# Patient Record
Sex: FEMALE | Race: WHITE | URBAN - METROPOLITAN AREA
[De-identification: names, ages, dates, MRNs, and addresses within clinical notes are randomized per-mention and may not be internally consistent; named-entity substitution may affect disease eponyms.]

---

## 2022-06-06 ENCOUNTER — MED REC SCAN ONLY (OUTPATIENT)
Dept: INTERNAL MEDICINE CLINIC | Facility: CLINIC | Age: 47
End: 2022-06-06

## 2022-06-06 ENCOUNTER — OFFICE VISIT (OUTPATIENT)
Dept: INTERNAL MEDICINE CLINIC | Facility: CLINIC | Age: 47
End: 2022-06-06
Payer: COMMERCIAL

## 2022-06-06 ENCOUNTER — LAB ENCOUNTER (OUTPATIENT)
Dept: LAB | Age: 47
End: 2022-06-06
Attending: INTERNAL MEDICINE

## 2022-06-06 VITALS
TEMPERATURE: 98 F | SYSTOLIC BLOOD PRESSURE: 108 MMHG | HEIGHT: 60 IN | WEIGHT: 147.19 LBS | HEART RATE: 64 BPM | OXYGEN SATURATION: 98 % | DIASTOLIC BLOOD PRESSURE: 68 MMHG | BODY MASS INDEX: 28.9 KG/M2

## 2022-06-06 DIAGNOSIS — Z00.00 PHYSICAL EXAM: Primary | ICD-10-CM

## 2022-06-06 DIAGNOSIS — J30.2 SEASONAL ALLERGIES: ICD-10-CM

## 2022-06-06 DIAGNOSIS — Z00.00 PHYSICAL EXAM: ICD-10-CM

## 2022-06-06 DIAGNOSIS — N60.19 FIBROCYSTIC BREAST DISEASE (FCBD), UNSPECIFIED LATERALITY: ICD-10-CM

## 2022-06-06 LAB
ALBUMIN SERPL-MCNC: 3.9 G/DL (ref 3.4–5)
ALBUMIN/GLOB SERPL: 1 {RATIO} (ref 1–2)
ALP LIVER SERPL-CCNC: 55 U/L
ALT SERPL-CCNC: 20 U/L
ANION GAP SERPL CALC-SCNC: 5 MMOL/L (ref 0–18)
AST SERPL-CCNC: 14 U/L (ref 15–37)
BASOPHILS # BLD AUTO: 0.03 X10(3) UL (ref 0–0.2)
BASOPHILS NFR BLD AUTO: 0.4 %
BILIRUB SERPL-MCNC: 0.2 MG/DL (ref 0.1–2)
BUN BLD-MCNC: 26 MG/DL (ref 7–18)
BUN/CREAT SERPL: 34.7 (ref 10–20)
CALCIUM BLD-MCNC: 9.6 MG/DL (ref 8.5–10.1)
CHLORIDE SERPL-SCNC: 107 MMOL/L (ref 98–112)
CHOLEST SERPL-MCNC: 175 MG/DL (ref ?–200)
CO2 SERPL-SCNC: 27 MMOL/L (ref 21–32)
CREAT BLD-MCNC: 0.75 MG/DL
DEPRECATED RDW RBC AUTO: 41.1 FL (ref 35.1–46.3)
EOSINOPHIL # BLD AUTO: 0.11 X10(3) UL (ref 0–0.7)
EOSINOPHIL NFR BLD AUTO: 1.4 %
ERYTHROCYTE [DISTWIDTH] IN BLOOD BY AUTOMATED COUNT: 12.7 % (ref 11–15)
EST. AVERAGE GLUCOSE BLD GHB EST-MCNC: 111 MG/DL (ref 68–126)
FASTING PATIENT LIPID ANSWER: NO
FASTING STATUS PATIENT QL REPORTED: NO
GLOBULIN PLAS-MCNC: 3.8 G/DL (ref 2.8–4.4)
GLUCOSE BLD-MCNC: 87 MG/DL (ref 70–99)
HBA1C MFR BLD: 5.5 % (ref ?–5.7)
HCT VFR BLD AUTO: 37 %
HDLC SERPL-MCNC: 62 MG/DL (ref 40–59)
HGB BLD-MCNC: 11.9 G/DL
IMM GRANULOCYTES # BLD AUTO: 0.02 X10(3) UL (ref 0–1)
IMM GRANULOCYTES NFR BLD: 0.3 %
LDLC SERPL CALC-MCNC: 97 MG/DL (ref ?–100)
LYMPHOCYTES # BLD AUTO: 2.84 X10(3) UL (ref 1–4)
LYMPHOCYTES NFR BLD AUTO: 37.4 %
MCH RBC QN AUTO: 28.4 PG (ref 26–34)
MCHC RBC AUTO-ENTMCNC: 32.2 G/DL (ref 31–37)
MCV RBC AUTO: 88.3 FL
MONOCYTES # BLD AUTO: 0.49 X10(3) UL (ref 0.1–1)
MONOCYTES NFR BLD AUTO: 6.5 %
NEUTROPHILS # BLD AUTO: 4.1 X10 (3) UL (ref 1.5–7.7)
NEUTROPHILS # BLD AUTO: 4.1 X10(3) UL (ref 1.5–7.7)
NEUTROPHILS NFR BLD AUTO: 54 %
NONHDLC SERPL-MCNC: 113 MG/DL (ref ?–130)
OSMOLALITY SERPL CALC.SUM OF ELEC: 292 MOSM/KG (ref 275–295)
PLATELET # BLD AUTO: 333 10(3)UL (ref 150–450)
POTASSIUM SERPL-SCNC: 4.7 MMOL/L (ref 3.5–5.1)
PROT SERPL-MCNC: 7.7 G/DL (ref 6.4–8.2)
RBC # BLD AUTO: 4.19 X10(6)UL
SODIUM SERPL-SCNC: 139 MMOL/L (ref 136–145)
T4 FREE SERPL-MCNC: 1 NG/DL (ref 0.8–1.7)
TRIGL SERPL-MCNC: 90 MG/DL (ref 30–149)
TSI SER-ACNC: 4.61 MIU/ML (ref 0.36–3.74)
VIT D+METAB SERPL-MCNC: 40.8 NG/ML (ref 30–100)
VLDLC SERPL CALC-MCNC: 15 MG/DL (ref 0–30)
WBC # BLD AUTO: 7.6 X10(3) UL (ref 4–11)

## 2022-06-06 PROCEDURE — 3008F BODY MASS INDEX DOCD: CPT | Performed by: INTERNAL MEDICINE

## 2022-06-06 PROCEDURE — 3078F DIAST BP <80 MM HG: CPT | Performed by: INTERNAL MEDICINE

## 2022-06-06 PROCEDURE — 99386 PREV VISIT NEW AGE 40-64: CPT | Performed by: INTERNAL MEDICINE

## 2022-06-06 PROCEDURE — 85025 COMPLETE CBC W/AUTO DIFF WBC: CPT

## 2022-06-06 PROCEDURE — 83036 HEMOGLOBIN GLYCOSYLATED A1C: CPT

## 2022-06-06 PROCEDURE — 36415 COLL VENOUS BLD VENIPUNCTURE: CPT

## 2022-06-06 PROCEDURE — 84443 ASSAY THYROID STIM HORMONE: CPT

## 2022-06-06 PROCEDURE — 80061 LIPID PANEL: CPT

## 2022-06-06 PROCEDURE — 3074F SYST BP LT 130 MM HG: CPT | Performed by: INTERNAL MEDICINE

## 2022-06-06 PROCEDURE — 82306 VITAMIN D 25 HYDROXY: CPT

## 2022-06-06 PROCEDURE — 80053 COMPREHEN METABOLIC PANEL: CPT

## 2022-06-06 PROCEDURE — 84439 ASSAY OF FREE THYROXINE: CPT

## 2022-06-06 RX ORDER — MULTIVIT-MIN/IRON/FOLIC ACID/K 18-600-40
1 CAPSULE ORAL
COMMUNITY

## 2022-06-06 RX ORDER — RUFINAMIDE 40 MG/ML
1 SUSPENSION ORAL DAILY
COMMUNITY

## 2022-06-08 DIAGNOSIS — D64.9 ANEMIA, UNSPECIFIED TYPE: ICD-10-CM

## 2022-06-08 DIAGNOSIS — R79.89 TSH ELEVATION: Primary | ICD-10-CM

## 2022-06-15 ENCOUNTER — OFFICE VISIT (OUTPATIENT)
Dept: OBGYN CLINIC | Facility: CLINIC | Age: 47
End: 2022-06-15
Payer: COMMERCIAL

## 2022-06-15 VITALS
BODY MASS INDEX: 29 KG/M2 | DIASTOLIC BLOOD PRESSURE: 70 MMHG | WEIGHT: 146.63 LBS | SYSTOLIC BLOOD PRESSURE: 105 MMHG | HEART RATE: 63 BPM

## 2022-06-15 DIAGNOSIS — Z01.419 ENCOUNTER FOR GYNECOLOGICAL EXAMINATION: Primary | ICD-10-CM

## 2022-06-15 DIAGNOSIS — Z12.4 SCREENING FOR MALIGNANT NEOPLASM OF CERVIX: ICD-10-CM

## 2022-06-15 DIAGNOSIS — Z12.31 ENCOUNTER FOR SCREENING MAMMOGRAM FOR BREAST CANCER: ICD-10-CM

## 2022-06-15 PROCEDURE — 99386 PREV VISIT NEW AGE 40-64: CPT | Performed by: OBSTETRICS & GYNECOLOGY

## 2022-06-15 PROCEDURE — 3074F SYST BP LT 130 MM HG: CPT | Performed by: OBSTETRICS & GYNECOLOGY

## 2022-06-15 PROCEDURE — 3078F DIAST BP <80 MM HG: CPT | Performed by: OBSTETRICS & GYNECOLOGY

## 2022-06-16 LAB — HPV I/H RISK 1 DNA SPEC QL NAA+PROBE: NEGATIVE

## 2022-06-24 ENCOUNTER — PATIENT MESSAGE (OUTPATIENT)
Dept: OBGYN CLINIC | Facility: CLINIC | Age: 47
End: 2022-06-24

## 2022-06-25 NOTE — TELEPHONE ENCOUNTER
From: Janell Jamil  To: Dom Rogers MD  Sent: 6/24/2022 7:04 PM CDT  Subject: Follow Up From Exam    Dr. Karl Guzman,  I wanted to follow-up on my Pap smear- I saw the negative HPV results come in but nothing in the paper.     Thank you,  Dajuan Riojas

## 2022-07-02 ENCOUNTER — HOSPITAL ENCOUNTER (OUTPATIENT)
Dept: MAMMOGRAPHY | Facility: HOSPITAL | Age: 47
Discharge: HOME | End: 2022-07-02
Attending: OBSTETRICS & GYNECOLOGY
Payer: COMMERCIAL

## 2022-07-02 DIAGNOSIS — Z12.31 ENCOUNTER FOR SCREENING MAMMOGRAM FOR BREAST CANCER: ICD-10-CM

## 2022-07-02 PROCEDURE — 77063 BREAST TOMOSYNTHESIS BI: CPT | Performed by: OBSTETRICS & GYNECOLOGY

## 2022-07-02 PROCEDURE — 77067 SCR MAMMO BI INCL CAD: CPT | Performed by: OBSTETRICS & GYNECOLOGY

## 2022-07-07 ENCOUNTER — HOSPITAL ENCOUNTER (OUTPATIENT)
Dept: MAMMOGRAPHY | Facility: HOSPITAL | Age: 47
Discharge: HOME | End: 2022-07-07
Attending: OBSTETRICS & GYNECOLOGY
Payer: COMMERCIAL

## 2022-07-07 DIAGNOSIS — R92.8 ABNORMAL MAMMOGRAM: ICD-10-CM

## 2022-07-07 PROCEDURE — 77065 DX MAMMO INCL CAD UNI: CPT | Performed by: OBSTETRICS & GYNECOLOGY

## 2022-07-07 PROCEDURE — 77061 BREAST TOMOSYNTHESIS UNI: CPT | Performed by: OBSTETRICS & GYNECOLOGY

## 2022-12-18 ENCOUNTER — PATIENT MESSAGE (OUTPATIENT)
Dept: INTERNAL MEDICINE CLINIC | Facility: CLINIC | Age: 47
End: 2022-12-18

## 2022-12-20 ENCOUNTER — PATIENT MESSAGE (OUTPATIENT)
Dept: INTERNAL MEDICINE CLINIC | Facility: CLINIC | Age: 47
End: 2022-12-20

## 2023-03-15 ENCOUNTER — TELEPHONE (OUTPATIENT)
Dept: INTERNAL MEDICINE CLINIC | Facility: CLINIC | Age: 48
End: 2023-03-15

## 2023-03-15 ENCOUNTER — OFFICE VISIT (OUTPATIENT)
Dept: INTERNAL MEDICINE CLINIC | Facility: CLINIC | Age: 48
End: 2023-03-15

## 2023-03-15 VITALS
WEIGHT: 145 LBS | BODY MASS INDEX: 28.47 KG/M2 | HEIGHT: 60 IN | OXYGEN SATURATION: 96 % | HEART RATE: 89 BPM | SYSTOLIC BLOOD PRESSURE: 108 MMHG | DIASTOLIC BLOOD PRESSURE: 80 MMHG

## 2023-03-15 DIAGNOSIS — R11.0 NAUSEA: Primary | ICD-10-CM

## 2023-03-15 PROCEDURE — 3008F BODY MASS INDEX DOCD: CPT | Performed by: INTERNAL MEDICINE

## 2023-03-15 PROCEDURE — 3079F DIAST BP 80-89 MM HG: CPT | Performed by: INTERNAL MEDICINE

## 2023-03-15 PROCEDURE — 3074F SYST BP LT 130 MM HG: CPT | Performed by: INTERNAL MEDICINE

## 2023-03-15 PROCEDURE — 99213 OFFICE O/P EST LOW 20 MIN: CPT | Performed by: INTERNAL MEDICINE

## 2023-03-15 RX ORDER — OMEPRAZOLE 40 MG/1
40 CAPSULE, DELAYED RELEASE ORAL DAILY
Qty: 30 CAPSULE | Refills: 1 | Status: SHIPPED | OUTPATIENT
Start: 2023-03-15

## 2023-03-15 NOTE — TELEPHONE ENCOUNTER
Spoke with MD, ok to use 3:40 PM slot for pt. Spoke with pt, scheduled for appt today 3:40 PM. Pt agreeable.

## 2023-03-15 NOTE — TELEPHONE ENCOUNTER
To Dr Jacob Kline -- Please Advise    Spoke with pt, symptom onset, 3/5 with nausea and decreased appetite after eating fries States for 3 days she was nauseous without vomiting. Denies diarrhea. Pt had a few loose stools mixed with normal BM, brown in color. Pt has been using BRAT diet and tolerating water and coffee. Last night she ate sweet potato, zucchini soup and protein shake, within the hour she had nausea for a few hours and then it subsided. Denies pain, vomiting, diarrhea. Denies fever, light headedness, fainting. Pt is having chills intermittently. Denies pain. Pt has been taking Tums without relief. Advised patient their symptoms/message will be passed on to their doctor for review. Asked that patient call the office back if they develop any new or worsening symptoms while awaiting our call back with recommendations. Advised that patient present to the ER or UC for worsening symptoms of fever, pain.

## 2023-03-15 NOTE — TELEPHONE ENCOUNTER
Patient calling for a late in the day appointment with Dr. Linh Flowers. About a week ago had the flu. Is feeling better, but still having nausea after eating.    No other symptoms    Best call back number [phone]

## 2023-03-16 RX ORDER — OMEPRAZOLE 40 MG/1
CAPSULE, DELAYED RELEASE ORAL
Qty: 90 CAPSULE | Refills: 0 | OUTPATIENT
Start: 2023-03-16

## 2023-03-30 ENCOUNTER — HOSPITAL ENCOUNTER (OUTPATIENT)
Dept: ULTRASOUND IMAGING | Age: 48
Discharge: HOME | End: 2023-03-30
Attending: INTERNAL MEDICINE
Payer: COMMERCIAL

## 2023-03-30 DIAGNOSIS — R11.0 NAUSEA: ICD-10-CM

## 2023-03-30 DIAGNOSIS — R10.11 RIGHT UPPER QUADRANT ABDOMINAL PAIN: Primary | ICD-10-CM

## 2023-03-30 PROCEDURE — 76700 US EXAM ABDOM COMPLETE: CPT | Performed by: INTERNAL MEDICINE

## 2023-06-01 ENCOUNTER — PATIENT MESSAGE (OUTPATIENT)
Dept: INTERNAL MEDICINE CLINIC | Facility: CLINIC | Age: 48
End: 2023-06-01

## 2023-06-01 NOTE — TELEPHONE ENCOUNTER
From: Yas Newman  To: Rangel Don MD  Sent: 6/1/2023 1:44 PM CDT  Subject: Re: Dermatologist     Good afternoon,  Is there a dermatologist in the network or one at [facility] that can be recommended and put a referral in for a visit?    Thank you,  Yas

## 2023-07-26 ENCOUNTER — TELEPHONE (OUTPATIENT)
Dept: OBGYN CLINIC | Facility: CLINIC | Age: 48
End: 2023-07-26

## 2023-07-26 ENCOUNTER — HOSPITAL ENCOUNTER (OUTPATIENT)
Dept: MAMMOGRAPHY | Age: 48
Discharge: HOME | End: 2023-07-26
Attending: OBSTETRICS & GYNECOLOGY
Payer: COMMERCIAL

## 2023-07-26 DIAGNOSIS — Z12.31 ENCOUNTER FOR SCREENING MAMMOGRAM FOR MALIGNANT NEOPLASM OF BREAST: ICD-10-CM

## 2023-07-26 DIAGNOSIS — R92.8 ABNORMAL MAMMOGRAM: Primary | ICD-10-CM

## 2023-07-26 PROCEDURE — 77067 SCR MAMMO BI INCL CAD: CPT | Performed by: OBSTETRICS & GYNECOLOGY

## 2023-07-26 PROCEDURE — 77063 BREAST TOMOSYNTHESIS BI: CPT | Performed by: OBSTETRICS & GYNECOLOGY

## 2023-07-26 NOTE — TELEPHONE ENCOUNTER
Received fax from [address] [employer] requesting addtl views of left breast and ultrasound. Order placed and copy placed in mammo book.

## 2023-08-10 ENCOUNTER — HOSPITAL ENCOUNTER (OUTPATIENT)
Dept: MAMMOGRAPHY | Facility: HOSPITAL | Age: 48
Discharge: HOME | End: 2023-08-10
Attending: OBSTETRICS & GYNECOLOGY
Payer: COMMERCIAL

## 2023-08-10 DIAGNOSIS — R92.8 ABNORMAL MAMMOGRAM: ICD-10-CM

## 2023-08-10 PROCEDURE — 77065 DX MAMMO INCL CAD UNI: CPT | Performed by: OBSTETRICS & GYNECOLOGY

## 2023-08-10 PROCEDURE — 77061 BREAST TOMOSYNTHESIS UNI: CPT | Performed by: OBSTETRICS & GYNECOLOGY

## 2023-08-16 ENCOUNTER — OFFICE VISIT (OUTPATIENT)
Dept: FAMILY MEDICINE CLINIC | Facility: CLINIC | Age: 48
End: 2023-08-16

## 2023-08-16 VITALS
BODY MASS INDEX: 30.63 KG/M2 | HEIGHT: 60 IN | DIASTOLIC BLOOD PRESSURE: 74 MMHG | SYSTOLIC BLOOD PRESSURE: 120 MMHG | HEART RATE: 64 BPM | WEIGHT: 156 LBS

## 2023-08-16 DIAGNOSIS — Z00.00 ENCOUNTER FOR ANNUAL PHYSICAL EXAMINATION EXCLUDING GYNECOLOGICAL EXAMINATION IN A PATIENT OLDER THAN 17 YEARS: Primary | ICD-10-CM

## 2023-08-16 DIAGNOSIS — Z12.83 SKIN CANCER SCREENING: ICD-10-CM

## 2023-08-16 DIAGNOSIS — Z13.21 ENCOUNTER FOR VITAMIN DEFICIENCY SCREENING: ICD-10-CM

## 2023-08-16 DIAGNOSIS — E66.09 CLASS 1 OBESITY DUE TO EXCESS CALORIES WITH BODY MASS INDEX (BMI) OF 30.0 TO 30.9 IN ADULT, UNSPECIFIED WHETHER SERIOUS COMORBIDITY PRESENT: ICD-10-CM

## 2023-08-16 DIAGNOSIS — Z78.0 POSTMENOPAUSAL: ICD-10-CM

## 2023-08-16 DIAGNOSIS — R63.5 WEIGHT GAIN: ICD-10-CM

## 2023-08-16 DIAGNOSIS — Z13.220 LIPID SCREENING: ICD-10-CM

## 2023-08-16 DIAGNOSIS — Z13.1 DIABETES MELLITUS SCREENING: ICD-10-CM

## 2023-08-16 DIAGNOSIS — F10.21 HISTORY OF ALCOHOL DEPENDENCE (HCC): ICD-10-CM

## 2023-08-16 PROBLEM — E66.811 CLASS 1 OBESITY DUE TO EXCESS CALORIES WITH BODY MASS INDEX (BMI) OF 30.0 TO 30.9 IN ADULT: Status: ACTIVE | Noted: 2023-08-16

## 2023-08-16 PROBLEM — F41.9 ANXIETY: Status: ACTIVE | Noted: 2023-08-16

## 2023-08-16 PROCEDURE — 99396 PREV VISIT EST AGE 40-64: CPT | Performed by: FAMILY MEDICINE

## 2023-08-16 PROCEDURE — 3078F DIAST BP <80 MM HG: CPT | Performed by: FAMILY MEDICINE

## 2023-08-16 PROCEDURE — 3074F SYST BP LT 130 MM HG: CPT | Performed by: FAMILY MEDICINE

## 2023-08-16 PROCEDURE — 3008F BODY MASS INDEX DOCD: CPT | Performed by: FAMILY MEDICINE

## 2023-08-17 ENCOUNTER — LAB ENCOUNTER (OUTPATIENT)
Dept: LAB | Age: 48
End: 2023-08-17
Attending: FAMILY MEDICINE
Payer: COMMERCIAL

## 2023-08-17 DIAGNOSIS — Z13.220 LIPID SCREENING: ICD-10-CM

## 2023-08-17 DIAGNOSIS — R63.5 WEIGHT GAIN: ICD-10-CM

## 2023-08-17 DIAGNOSIS — Z13.21 ENCOUNTER FOR VITAMIN DEFICIENCY SCREENING: ICD-10-CM

## 2023-08-17 DIAGNOSIS — Z13.1 DIABETES MELLITUS SCREENING: ICD-10-CM

## 2023-08-17 DIAGNOSIS — E03.8 SUBCLINICAL HYPOTHYROIDISM: Primary | ICD-10-CM

## 2023-08-17 LAB
ALBUMIN SERPL-MCNC: 4 G/DL (ref 3.4–5)
ALBUMIN/GLOB SERPL: 1.2 {RATIO} (ref 1–2)
ALP LIVER SERPL-CCNC: 47 U/L
ALT SERPL-CCNC: 22 U/L
ANION GAP SERPL CALC-SCNC: 7 MMOL/L (ref 0–18)
AST SERPL-CCNC: 15 U/L (ref 15–37)
ATRIAL RATE: 56 BPM
BILIRUB SERPL-MCNC: 0.3 MG/DL (ref 0.1–2)
BUN BLD-MCNC: 20 MG/DL (ref 7–18)
BUN/CREAT SERPL: 23.5 (ref 10–20)
CALCIUM BLD-MCNC: 9.5 MG/DL (ref 8.5–10.1)
CHLORIDE SERPL-SCNC: 107 MMOL/L (ref 98–112)
CHOLEST SERPL-MCNC: 190 MG/DL (ref ?–200)
CO2 SERPL-SCNC: 26 MMOL/L (ref 21–32)
CREAT BLD-MCNC: 0.85 MG/DL
EGFRCR SERPLBLD CKD-EPI 2021: 84 ML/MIN/1.73M2 (ref 60–?)
EST. AVERAGE GLUCOSE BLD GHB EST-MCNC: 105 MG/DL (ref 68–126)
FASTING PATIENT LIPID ANSWER: NO
FASTING STATUS PATIENT QL REPORTED: NO
GLOBULIN PLAS-MCNC: 3.4 G/DL (ref 2.8–4.4)
GLUCOSE BLD-MCNC: 78 MG/DL (ref 70–99)
HBA1C MFR BLD: 5.3 % (ref ?–5.7)
HDLC SERPL-MCNC: 88 MG/DL (ref 40–59)
LDLC SERPL CALC-MCNC: 86 MG/DL (ref ?–100)
NONHDLC SERPL-MCNC: 102 MG/DL (ref ?–130)
OSMOLALITY SERPL CALC.SUM OF ELEC: 291 MOSM/KG (ref 275–295)
P AXIS: -4 DEGREES
P-R INTERVAL: 158 MS
POTASSIUM SERPL-SCNC: 4.3 MMOL/L (ref 3.5–5.1)
PROT SERPL-MCNC: 7.4 G/DL (ref 6.4–8.2)
Q-T INTERVAL: 424 MS
QRS DURATION: 80 MS
QTC CALCULATION (BEZET): 409 MS
R AXIS: 21 DEGREES
SODIUM SERPL-SCNC: 140 MMOL/L (ref 136–145)
T AXIS: 43 DEGREES
T4 FREE SERPL-MCNC: 1 NG/DL (ref 0.8–1.7)
TRIGL SERPL-MCNC: 91 MG/DL (ref 30–149)
TSI SER-ACNC: 3.9 MIU/ML (ref 0.36–3.74)
VENTRICULAR RATE: 56 BPM
VIT D+METAB SERPL-MCNC: 46.2 NG/ML (ref 30–100)
VLDLC SERPL CALC-MCNC: 14 MG/DL (ref 0–30)

## 2023-08-17 PROCEDURE — 80053 COMPREHEN METABOLIC PANEL: CPT

## 2023-08-17 PROCEDURE — 93010 ELECTROCARDIOGRAM REPORT: CPT | Performed by: INTERNAL MEDICINE

## 2023-08-17 PROCEDURE — 82306 VITAMIN D 25 HYDROXY: CPT

## 2023-08-17 PROCEDURE — 36415 COLL VENOUS BLD VENIPUNCTURE: CPT

## 2023-08-17 PROCEDURE — 83036 HEMOGLOBIN GLYCOSYLATED A1C: CPT

## 2023-08-17 PROCEDURE — 93005 ELECTROCARDIOGRAM TRACING: CPT

## 2023-08-17 PROCEDURE — 84443 ASSAY THYROID STIM HORMONE: CPT

## 2023-08-17 PROCEDURE — 84439 ASSAY OF FREE THYROXINE: CPT

## 2023-08-17 PROCEDURE — 80061 LIPID PANEL: CPT

## 2023-08-18 ENCOUNTER — PATIENT MESSAGE (OUTPATIENT)
Dept: FAMILY MEDICINE CLINIC | Facility: CLINIC | Age: 48
End: 2023-08-18

## 2023-08-19 ENCOUNTER — LAB ENCOUNTER (OUTPATIENT)
Dept: LAB | Age: 48
End: 2023-08-19
Attending: FAMILY MEDICINE
Payer: COMMERCIAL

## 2023-08-19 DIAGNOSIS — E03.8 SUBCLINICAL HYPOTHYROIDISM: ICD-10-CM

## 2023-08-19 LAB
THYROGLOB SERPL-MCNC: <15 U/ML (ref ?–60)
THYROPEROXIDASE AB SERPL-ACNC: 29 U/ML (ref ?–60)

## 2023-08-19 PROCEDURE — 86376 MICROSOMAL ANTIBODY EACH: CPT

## 2023-08-19 PROCEDURE — 86800 THYROGLOBULIN ANTIBODY: CPT

## 2023-08-19 PROCEDURE — 36415 COLL VENOUS BLD VENIPUNCTURE: CPT

## 2023-08-21 DIAGNOSIS — E03.8 SUBCLINICAL HYPOTHYROIDISM: Primary | ICD-10-CM

## 2023-09-11 ENCOUNTER — OFFICE VISIT (OUTPATIENT)
Dept: DERMATOLOGY CLINIC | Facility: CLINIC | Age: 48
End: 2023-09-11

## 2023-09-11 DIAGNOSIS — D22.9 MULTIPLE NEVI: ICD-10-CM

## 2023-09-11 DIAGNOSIS — Z12.83 SCREENING EXAM FOR SKIN CANCER: Primary | ICD-10-CM

## 2023-09-11 NOTE — PROGRESS NOTES
HPI:    Patient ID: Bobby Gutierrez is a 50year old female. Patient presents for full body skin exam. No areas of concern noted. Denies personal or family hx of skin cancer. No allergies to medications noted. Does try to wear sun screen. Works as a teacher. Review of Systems   Constitutional:  Negative for chills and fever. Musculoskeletal:  Negative for arthralgias and myalgias. Skin:  Positive for rash. Negative for color change and wound. Current Outpatient Medications   Medication Sig Dispense Refill    sertraline 25 MG Oral Tab Take 1 tablet (25 mg total) by mouth daily. 90 tablet 1    multivitamin Oral Chew Tab Chew 1 tablet by mouth daily. Allergies:  Seasonal                ITCHING, OTHER (SEE COMMENTS)    Comment:Watery eyes, sneezing   LMP 07/01/2021 (Approximate)   There is no height or weight on file to calculate BMI. PHYSICAL EXAM:   Physical Exam  Constitutional:       General: She is not in acute distress. Appearance: Normal appearance. Skin:     General: Skin is warm and dry. Findings: Rash present. Comments: Several scattered nevi noted throughout the body. No draining or tenderness noted. Even in color. Neurological:      Mental Status: She is alert and oriented to person, place, and time. ASSESSMENT/PLAN:   1. Screening exam for skin cancer  -After discussion with patient, advised the following:.  Reassurance regarding other benign skin lesions. Signs and symptoms of skin cancer, ABCDE's of melanoma discussed with patient. Sunscreen use, sun protection, self exams reviewed. Followup as noted RTC ---routine checkup 6 mos -one year or p.r.n.   -Pt was agreeable to plan and will comply with discussion above.      2. Multiple nevi  -After discussion with patient, advised the following:  -Benign lesions  -Watch for now  -return if there are changes  -Went over ABCDE changes  -To call or follow-up with worsening symptoms or concerns.   -Pt was agreeable to plan and will comply with discussion above. Jomar quigley        No orders of the defined types were placed in this encounter.       Meds This Visit:  Requested Prescriptions      No prescriptions requested or ordered in this encounter       Imaging & Referrals:  None         #[id number]

## 2023-09-22 ENCOUNTER — OFFICE VISIT (OUTPATIENT)
Dept: FAMILY MEDICINE CLINIC | Facility: CLINIC | Age: 48
End: 2023-09-22

## 2023-09-22 VITALS
BODY MASS INDEX: 30.04 KG/M2 | TEMPERATURE: 98 F | SYSTOLIC BLOOD PRESSURE: 94 MMHG | RESPIRATION RATE: 16 BRPM | HEIGHT: 60 IN | WEIGHT: 153 LBS | DIASTOLIC BLOOD PRESSURE: 60 MMHG | HEART RATE: 58 BPM

## 2023-09-22 DIAGNOSIS — F41.9 ANXIETY: ICD-10-CM

## 2023-09-22 DIAGNOSIS — E66.3 OVERWEIGHT (BMI 25.0-29.9): ICD-10-CM

## 2023-09-22 DIAGNOSIS — Z78.0 POSTMENOPAUSAL: Primary | ICD-10-CM

## 2023-09-22 PROCEDURE — 3008F BODY MASS INDEX DOCD: CPT | Performed by: FAMILY MEDICINE

## 2023-09-22 PROCEDURE — 3074F SYST BP LT 130 MM HG: CPT | Performed by: FAMILY MEDICINE

## 2023-09-22 PROCEDURE — 99215 OFFICE O/P EST HI 40 MIN: CPT | Performed by: FAMILY MEDICINE

## 2023-09-22 PROCEDURE — 3078F DIAST BP <80 MM HG: CPT | Performed by: FAMILY MEDICINE

## 2023-09-22 RX ORDER — PHENTERMINE HYDROCHLORIDE 15 MG/1
15 CAPSULE ORAL EVERY MORNING
Qty: 30 CAPSULE | Refills: 0 | Status: SHIPPED | OUTPATIENT
Start: 2023-09-22 | End: 2023-10-22

## 2023-09-22 NOTE — PATIENT INSTRUCTIONS
Recommendations:         Nutritional Goals Reviewed and Discussed:     Protein goal of 90-120gms per day - no more than 30g at a time, Fiber 25-35g per day   Examples of fiber:   Artichokes  Brussel sprouts  Spinach  Berries  Plantains  Bananas  Avocados  Apples  Pomegranates    Try Truvani protein powder. Follow meal plate image and 1/2 of salad plate should be veggies. Of the remaining 1/2, 3/4 should be protein and 1/4 can be carbs. Behavior Modifications Reviewed and Discussed:    Meal prep  Read nutrition labels  Drink 64oz of water per day and No drinking 30 minutes before or after meals,  Utilize portion control strategies to reduce calorie intake  Identify triggers for eating and manage cues  Eat slowly and take 20 to 30 minutes to complete each meal      Physical Activity Reviewed and Discussed: For weight loss, recommended goals are:  300min of moderate intensity activity or 150 minutes of high intensity activity per week +2 days of strength training. HR for moderate intensity activity: 64-76% of max HR = 110 - 131  HR for high intensity activity: 77-93% of max HR = 132 - 160      Medications Reviewed and Discussed: To start on phentermine. Phentermine works in the brain to decrease appetite and food intake, as well as increase your metabolism. Potential side effects include: most commonly dry mouth, insomnia which typically improves with time, constipation. Other potential, but less common including palpitations, headache, irritability. Potential weight loss is about 5%, but is not guaranteed     Phentermine is only approved for short-term use. However:   From the Endocrine Society Clinical Practice Guideline as it pertains to long-term phentermine use: There is currently minimal evidence of any serious long-term side effects of phentermine for more than 20 years. .it seems reasonable for clinicians to prescribe phentermine as long as the patient; 1) has no evidence of serious cardiovascular disease; 2) did not have serious psychiatric disease or history of substance abuse; 3) has been informed about weight loss medications that are FDA approved for long-term use and told that these have been documented to be safe and effective whereas phentermine has not for; 4) does not demonstrate a clinically significant increase in pulse or blood pressure when taking phentermine; and 5) demonstrate significant weight loss while using the medication.

## 2023-09-23 ENCOUNTER — PATIENT MESSAGE (OUTPATIENT)
Dept: FAMILY MEDICINE CLINIC | Facility: CLINIC | Age: 48
End: 2023-09-23

## 2023-10-14 ENCOUNTER — LAB ENCOUNTER (OUTPATIENT)
Dept: LAB | Age: 48
End: 2023-10-14
Attending: FAMILY MEDICINE
Payer: COMMERCIAL

## 2023-10-14 DIAGNOSIS — E03.8 SUBCLINICAL HYPOTHYROIDISM: ICD-10-CM

## 2023-10-14 LAB — TSI SER-ACNC: 2.5 MIU/ML (ref 0.36–3.74)

## 2023-10-14 PROCEDURE — 36415 COLL VENOUS BLD VENIPUNCTURE: CPT

## 2023-10-14 PROCEDURE — 84443 ASSAY THYROID STIM HORMONE: CPT

## 2023-10-18 RX ORDER — PHENTERMINE HYDROCHLORIDE 15 MG/1
15 CAPSULE ORAL EVERY MORNING
Qty: 30 CAPSULE | Refills: 0 | Status: SHIPPED | OUTPATIENT
Start: 2023-10-18 | End: 2023-11-17

## 2023-10-18 NOTE — TELEPHONE ENCOUNTER
Please review. Protocol failed or has no protocol. Phentermine HCl 15 MG Oral Cap [Mahogany Tanagar]      Patient Comment: I see the doctor on Wednesday, October 25 but only have a few days left in current prescription. Not sure if going to change anything to prescription once i meet with her?       Recent Outpatient Visits              3 weeks ago Postmenopausal    [facility], [address] [facility] [place] , [place]    Office Visit    1 month ago Screening exam for skin cancer    Liz Dunbar [place], [place]    Office Visit    2 months ago Encounter for annual physical examination excluding gynecological examination in a patient older than 17 years    Greg Álvarez, [address] [facility]  [address], Ale[place] Se[place]our, [place]    Office Visit    7 months ago Nausea    [employer], [place], [place]    Office Visit    1 year ago Encounter for gynecological examination    Reny Gerard, [address], [address] Kristine Miranda MD    Office Visit            Future Appointments         Provider Department Appt Notes    In 1 week DO Reny Funez, [address] [facility] Angelina[place]iz weight    In 1 month MD Reny El, [address] [facility], [facility] annual    In 4 months MD Reny El, [address], [facility] [facility] Yearly exam

## 2023-11-16 NOTE — TELEPHONE ENCOUNTER
Please review refill protocol failed/ no protocol  Requested Prescriptions   Pending Prescriptions Disp Refills    PHENTERMINE HCL 15 MG Oral Cap [Pharmacy Med Name: PHENTERMINE HCL 15MG CAPSULES] 30 capsule 0     Sig: TAKE 1 CAPSULE(15 MG) BY MOUTH EVERY MORNING       There is no refill protocol information for this order

## 2023-11-17 RX ORDER — PHENTERMINE HYDROCHLORIDE 15 MG/1
15 CAPSULE ORAL EVERY MORNING
Qty: 30 CAPSULE | Refills: 0 | Status: SHIPPED | OUTPATIENT
Start: 2023-11-17

## 2023-11-29 ENCOUNTER — OFFICE VISIT (OUTPATIENT)
Dept: FAMILY MEDICINE CLINIC | Facility: CLINIC | Age: 48
End: 2023-11-29
Payer: COMMERCIAL

## 2023-11-29 VITALS
HEIGHT: 61 IN | DIASTOLIC BLOOD PRESSURE: 73 MMHG | SYSTOLIC BLOOD PRESSURE: 107 MMHG | HEART RATE: 75 BPM | BODY MASS INDEX: 28.89 KG/M2 | WEIGHT: 153 LBS | OXYGEN SATURATION: 97 %

## 2023-11-29 DIAGNOSIS — R05.1 ACUTE COUGH: ICD-10-CM

## 2023-11-29 DIAGNOSIS — E66.3 OVERWEIGHT (BMI 25.0-29.9): Primary | ICD-10-CM

## 2023-11-29 PROCEDURE — 3008F BODY MASS INDEX DOCD: CPT | Performed by: FAMILY MEDICINE

## 2023-11-29 PROCEDURE — 99214 OFFICE O/P EST MOD 30 MIN: CPT | Performed by: FAMILY MEDICINE

## 2023-11-29 PROCEDURE — 3078F DIAST BP <80 MM HG: CPT | Performed by: FAMILY MEDICINE

## 2023-11-29 PROCEDURE — 3074F SYST BP LT 130 MM HG: CPT | Performed by: FAMILY MEDICINE

## 2023-11-29 RX ORDER — PHENTERMINE HYDROCHLORIDE 30 MG/1
30 CAPSULE ORAL EVERY MORNING
Qty: 30 CAPSULE | Refills: 0 | Status: SHIPPED | OUTPATIENT
Start: 2023-11-29 | End: 2023-12-29

## 2023-11-29 RX ORDER — PHENTERMINE HYDROCHLORIDE 15 MG/1
30 CAPSULE ORAL EVERY MORNING
Qty: 60 CAPSULE | Refills: 0 | Status: SHIPPED | OUTPATIENT
Start: 2023-11-29 | End: 2023-11-29

## 2023-11-29 RX ORDER — BENZONATATE 200 MG/1
200 CAPSULE ORAL 3 TIMES DAILY PRN
Qty: 21 CAPSULE | Refills: 0 | Status: SHIPPED | OUTPATIENT
Start: 2023-11-29 | End: 2023-12-06

## 2023-11-29 NOTE — PATIENT INSTRUCTIONS
Nutritional Goals  Calorie-controlled diet:  5469-6449, Limit carbohydrates to <100-115 gms per day, and Other: Protein 90g     Behavior Modifications Reviewed and Discussed    Log food 1-2x per week at the end of the day to determine if you are meeting your calorie, carb/protein/fiber goals. This can help you make adjustments going forward. Exercise Goals Reviewed and Discussed    Look into KEO which will give you a more complete workout, allow for variation in exercise and help you be more accountable. For weight loss, recommended goals are:  300min of moderate intensity activity or 150 minutes of high intensity activity per week +2 days of strength training. Medication Goals Reviewed and Discussed    Increase phentermine dose to 30mg.

## 2023-12-24 DIAGNOSIS — E66.3 OVERWEIGHT (BMI 25.0-29.9): ICD-10-CM

## 2023-12-26 NOTE — TELEPHONE ENCOUNTER
Please review; protocol failed/ has no protocol    Requested Prescriptions   Pending Prescriptions Disp Refills    PHENTERMINE HCL 30 MG Oral Cap [Pharmacy Med Name: PHENTERMINE HCL 30MG CAPSULES] 30 capsule 0     Sig: TAKE 1 CAPSULE(30 MG) BY MOUTH EVERY MORNING       There is no refill protocol information for this order        Recent Outpatient Visits              3 weeks ago Overweight (BMI 25.0-29. 9)    [address] [facility], [place]    Office Visit    2 months ago [address], [place]    Office Visit    3 months ago Postmenopausal    [address] [facility] [place] Ziyad [place]    Office Visit    3 months ago Screening exam for skin cancer    [address] [facility], [place] Chasidy Sepulveda [facility]    Office Visit    4 months ago Encounter for annual physical examination excluding gynecological examination in a patient older than 17 years    [address] [facility], [address] [facility] [address], [place] Ziyad    Office Visit          Future Appointments         Provider Department Appt Notes    In 1 week Ziyad Manning DO [address], [place]     In 1 month Joann Landeros MD [address], [address] [place] [address],[address], [address] Yearly exam

## 2023-12-27 RX ORDER — PHENTERMINE HYDROCHLORIDE 30 MG/1
30 CAPSULE ORAL EVERY MORNING
Qty: 30 CAPSULE | Refills: 0 | Status: SHIPPED | OUTPATIENT
Start: 2023-12-27

## 2024-01-04 ENCOUNTER — OFFICE VISIT (OUTPATIENT)
Dept: FAMILY MEDICINE CLINIC | Facility: CLINIC | Age: 49
End: 2024-01-04
Payer: COMMERCIAL

## 2024-01-04 VITALS
DIASTOLIC BLOOD PRESSURE: 76 MMHG | WEIGHT: 146.13 LBS | SYSTOLIC BLOOD PRESSURE: 108 MMHG | HEIGHT: 61 IN | BODY MASS INDEX: 27.59 KG/M2 | HEART RATE: 70 BPM | RESPIRATION RATE: 17 BRPM

## 2024-01-04 DIAGNOSIS — E66.3 OVERWEIGHT (BMI 25.0-29.9): ICD-10-CM

## 2024-01-04 DIAGNOSIS — E03.8 SUBCLINICAL HYPOTHYROIDISM: ICD-10-CM

## 2024-01-04 DIAGNOSIS — Z78.0 POSTMENOPAUSAL: Primary | ICD-10-CM

## 2024-01-04 PROCEDURE — 3074F SYST BP LT 130 MM HG: CPT | Performed by: FAMILY MEDICINE

## 2024-01-04 PROCEDURE — 3078F DIAST BP <80 MM HG: CPT | Performed by: FAMILY MEDICINE

## 2024-01-04 PROCEDURE — 3008F BODY MASS INDEX DOCD: CPT | Performed by: FAMILY MEDICINE

## 2024-01-04 PROCEDURE — 99213 OFFICE O/P EST LOW 20 MIN: CPT | Performed by: FAMILY MEDICINE

## 2024-01-04 RX ORDER — PHENTERMINE HYDROCHLORIDE 30 MG/1
30 CAPSULE ORAL EVERY MORNING
Qty: 30 CAPSULE | Refills: 0 | Status: SHIPPED | OUTPATIENT
Start: 2024-03-05 | End: 2024-04-04

## 2024-01-04 RX ORDER — PHENTERMINE HYDROCHLORIDE 30 MG/1
30 CAPSULE ORAL EVERY MORNING
Qty: 30 CAPSULE | Refills: 0 | Status: SHIPPED | OUTPATIENT
Start: 2024-02-04 | End: 2024-03-05

## 2024-01-04 NOTE — PATIENT INSTRUCTIONS
Nutritional Goals  Calorie-controlled diet:  1913-1464, Limit carbohydrates to <100-115 gms per day, and Other: Protein 90g      Behavior Modifications Reviewed and Discussed     Log food 1-2x per week at the end of the day to determine if you are meeting your calorie, carb/protein/fiber goals.  This can help you make adjustments going forward.        Exercise Goals Reviewed and Discussed    Continue KEO M and Wed and then tony 2 other days.      For weight loss, recommended goals are:  300min of moderate intensity activity or 150 minutes of high intensity activity per week +2 days of strength training.      Medication Goals Reviewed and Discussed    Increase phentermine dose to 30mg.

## 2024-01-04 NOTE — PROGRESS NOTES
Have [facility], [address] [address]  Dept: [phone]       Patient:  Yas Newman  :      1975  MRN:      [id number]    Chief Complaint:    Chief Complaint   Patient presents with    Weight Management       SUBJECTIVE     History of Present Illness:  Ysa is being seen today for a follow-up for f/u on weight after increasing phentermine 30mg       1. Weight   - started KEO 2x per week.   - used KEO tony while on vacation and some beach yoga.   - has been more mindful of food choices. Using fruit to satisfy sugar cravings.         Past Medical History:   Past Medical History:   Diagnosis Date    Allergic rhinitis Spring 2015    Anxiety 2023    Fibrocystic breast     Seasonal allergies         At last visit goals included:          Nutritional Goals  Calorie-controlled diet:  6087-4452, Limit carbohydrates to <100-115 gms per day, and Other: Protein 90g     Behavior Modifications Reviewed and Discussed    Log food 1-2x per week at the end of the day to determine if you are meeting your calorie, carb/protein/fiber goals.  This can help you make adjustments going forward.    Exercise Goals Reviewed and Discussed    Look into KEO which will give you a more complete workout, allow for variation in exercise and help you be more accountable.    For weight loss, recommended goals are:  300min of moderate intensity activity or 150 minutes of high intensity activity per week +2 days of strength training.     Medication Goals Reviewed and Discussed    Increase phentermine dose to 30mg.     Food Journal  Reviewed and Discussed:       Patient has a Food Journal?: Yes, prior to trent, and is planning on restarting.   Patient is reading nutrition labels?  yes  Average Caloric Intake:  1400 calories   Average CHO Intake: Unprocessed, <130g of carbs   Average Protein Intake:    Average Fiber:  Is patient  exercising? yes  Type of exercise? [employer] 2x per week     Eating Habits  Patient states the following:  Eats 3 meal(s) per day  Length of time it takes to consume a meal:    # of snacks per day: 1 Type of snacks:  protein shake/yogurt   Amount of soda consumption per day:  no  Amount of water (in ounces) per day:  throughout the day  Drinking between meals only:  yes  No emotional eating.      OBJECTIVE     Vitals: /76   Pulse 70   Resp 17   Ht 5' 1\" (1.549 m)   Wt 146 lb 2 oz (66.3 kg)   LMP 07/01/2021 (Approximate)   BMI 27.61 kg/m²     Initial weight loss: 3   Total weight loss: 10   Start weight: 156   Goal weight: 125-130     Wt Readings from Last 3 Encounters:   01/04/24 146 lb 2 oz (66.3 kg)   11/29/23 153 lb (69.4 kg)   10/25/23 152 lb (68.9 kg)       Patient Medications:  has a current medication list which includes the following prescription(s): [START ON 2/4/2024] phentermine hcl, [START ON 3/5/2024] phentermine hcl, sertraline, and multivitamin.   Allergies:  Seasonal         ROS:    Review of Systems   Constitutional:  Positive for weight loss.   HENT:          Dry mouth: ros   Cardiovascular:  Negative for chest pain and palpitations.   Gastrointestinal:  Negative for constipation, nausea and vomiting.   Neurological:  Negative for headaches.   Psychiatric/Behavioral:  The patient is not nervous/anxious and does not have insomnia.         Physical Exam:   Physical Exam  Constitutional:       General: She is not in acute distress.     Appearance: Normal appearance.   Cardiovascular:      Rate and Rhythm: Normal rate and regular rhythm.      Heart sounds: Normal heart sounds.   Pulmonary:      Effort: Pulmonary effort is normal. No respiratory distress.      Breath sounds: Normal breath sounds. No wheezing, rhonchi or rales.   Neurological:      Mental Status: She is alert.   Psychiatric:         Mood and Affect: Mood normal.         Behavior: Behavior normal.         Thought  Content: Thought content normal.          ASSESSMENT/PLAN   Yas Newman is a 48 year old female, postmenopausal with history of BED, now controlled, anxiety, with a bmi of 28 who is here for f/u on weight after increasing dose of phentermine to 30mg.  Pt has continued to make dietary modifications and started a new workout regimen.  Since her last visit has lost 7lbs.  TBWL= 6.4% since starting 3months ago.  Tolerating medication well.  Reviewed with pt goals as below.   CPM with phentermine 30mg  To f/u with Dr. Fuller in 6wks.        Problem List Items Addressed This Visit       Overweight (BMI 25.0-29.9)    Overview     Initial Weight C/s: 9/22/23  Initial Weight: 153  Goal Weight: 120-130  Lifestyle modifications (diet + exercise): 2 years  Barriers: emotional eating occasionally, exercise under amount needed for weight loss.   Comorbid conditions: anxiety, postmenopausal  Insurance Coverage:   EKG: nml 9/2023  Birth Control: postmenopausal   Medications:   9/2023: started on phentermine 15mg - keep track of protein, increase exercise.   11/2023: increase dose of phentermine to 30mg  1/2023: doing well on phentermine 30mg.  CPM             Relevant Medications    Phentermine HCl 30 MG Oral Cap (Start on 2/4/2024)    Phentermine HCl 30 MG Oral Cap (Start on 3/5/2024)    Postmenopausal - Primary    Subclinical hypothyroidism    Overview     antiTPO and thyrogobulin ab neg.   Rpt level 10/21/2023 -ordered                 Nutritional Goals  Calorie-controlled diet:  3302-9072, Limit carbohydrates to <100-115 gms per day, and Other: Protein 90g     Behavior Modifications Reviewed and Discussed    Log food 1-2x per week at the end of the day to determine if you are meeting your calorie, carb/protein/fiber goals.  This can help you make adjustments going forward.      Exercise Goals Reviewed and Discussed    Continue KEO M and Wed and then tony 2 other days.     For weight loss, recommended goals are:  300min  of moderate intensity activity or 150 minutes of high intensity activity per week +2 days of strength training.     Medication Goals Reviewed and Discussed    Continue phentermine dose to 30mg.       Mahogany Parnell,    1/4/2024

## 2024-02-05 ENCOUNTER — TELEPHONE (OUTPATIENT)
Dept: FAMILY MEDICINE CLINIC | Facility: CLINIC | Age: 49
End: 2024-02-05

## 2024-02-16 ENCOUNTER — OFFICE VISIT (OUTPATIENT)
Dept: OBGYN CLINIC | Facility: CLINIC | Age: 49
End: 2024-02-16
Payer: COMMERCIAL

## 2024-02-16 VITALS
DIASTOLIC BLOOD PRESSURE: 70 MMHG | HEIGHT: 60.5 IN | BODY MASS INDEX: 28.12 KG/M2 | SYSTOLIC BLOOD PRESSURE: 103 MMHG | HEART RATE: 91 BPM | WEIGHT: 147 LBS

## 2024-02-16 DIAGNOSIS — Z12.31 ENCOUNTER FOR SCREENING MAMMOGRAM FOR BREAST CANCER: ICD-10-CM

## 2024-02-16 DIAGNOSIS — Z01.419 ENCOUNTER FOR GYNECOLOGICAL EXAMINATION: Primary | ICD-10-CM

## 2024-02-16 PROCEDURE — 3008F BODY MASS INDEX DOCD: CPT | Performed by: OBSTETRICS & GYNECOLOGY

## 2024-02-16 PROCEDURE — 3078F DIAST BP <80 MM HG: CPT | Performed by: OBSTETRICS & GYNECOLOGY

## 2024-02-16 PROCEDURE — 99396 PREV VISIT EST AGE 40-64: CPT | Performed by: OBSTETRICS & GYNECOLOGY

## 2024-02-16 PROCEDURE — 3074F SYST BP LT 130 MM HG: CPT | Performed by: OBSTETRICS & GYNECOLOGY

## 2024-02-19 NOTE — PROGRESS NOTES
Yas Newman is a 48 year old female  No LMP recorded. (Menstrual status: Menopause). here for annual exam.       Last seen 2022.   Last pap 2022 normal with neg HPV    Last mammogram 2023.  Had additional views 2023.      LMP .      On phentermine for weight loss    OBSTETRICS HISTORY:  OB History    Para Term  AB Living   1 1 1 0 0 1   SAB IAB Ectopic Multiple Live Births   0 0 0 0 1       GYNE HISTORY   Hx Prior Abnormal Pap: No  Pap Date: 06/15/22  Pap Result Notes: NEG PAP / NEG HPV  Follow Up Recommendation: MAMMO BILATERAL 23 INCOMPLETE         8/10/23 LEFT BREAST NEG    MEDICAL HISTORY:  Past Medical History:   Diagnosis Date    Allergic rhinitis Spring 2015    Anxiety 2023    Fibrocystic breast     Seasonal allergies      Past Surgical History:   Procedure Laterality Date      2015       SOCIAL HISTORY:  Social History     Socioeconomic History    Marital status: Single   Tobacco Use    Smoking status: Former     Packs/day: 0.50     Years: 3.00     Additional pack years: 0.00     Total pack years: 1.50     Types: Cigarettes     Quit date: 1998     Years since quittin.4    Smokeless tobacco: Never   Vaping Use    Vaping Use: Never used   Substance and Sexual Activity    Alcohol use: Not Currently     Comment: quit 2021 - daily prior, worsened during pandemic - at some point was drinking a bottle of wine.    Drug use: Never    Sexual activity: Not Currently   Other Topics Concern    History of tanning Yes    Reaction to local anesthetic No    Pt has a pacemaker No    Pt has a defibrillator No       FAMILY HISTORY:  Family History   Problem Relation Age of Onset    Heart Disorder Mother 72    Other (cad) Mother     Other (etoh abuse) Father     Heart Attack Paternal Grandfather 50    Cancer Neg         breast, ovarian, uterine, cervical, colon       MEDICATIONS:  Current Outpatient Medications   Medication Sig Dispense  Refill    Phentermine HCl 30 MG Oral Cap Take 1 capsule (30 mg total) by mouth every morning. 30 capsule 0    sertraline 25 MG Oral Tab Take 1 tablet (25 mg total) by mouth daily. 90 tablet 1    multivitamin Oral Chew Tab Chew 1 tablet by mouth daily.         ALLERGIES:    Allergies   Allergen Reactions    Seasonal ITCHING and OTHER (SEE COMMENTS)     Watery eyes, sneezing         Depression Screening (PHQ-2/PHQ-9): Over the LAST 2 WEEKS   Little interest or pleasure in doing things: Not at all    Feeling down, depressed, or hopeless: Not at all    PHQ-2 SCORE: 0           Review of Systems:  Constitutional:  Denies fatigue, night sweats, hot flashes  Eyes:  denies blurred or double vision  Cardiovascular:  denies chest pain or palpitations  Respiratory:  denies shortness of breath  Gastrointestinal:  denies heartburn, abdominal pain, diarrhea or constipation  Genitourinary:  denies dysuria, incontinence, abnormal vaginal discharge, vaginal itching  Musculoskeletal:  denies back pain.  Skin/Breast:  Denies any breast pain, lumps, or discharge.   Neurological:  denies headaches, extremity weakness or numbness.  Psychiatric: denies depression or anxiety.  Endocrine:   denies excessive thirst or urination.  Heme/Lymph:  easy bruising or bleeding.    PHYSICAL EXAM:   /70   Pulse 91   Ht 5' 0.5\" (1.537 m)   Wt 147 lb (66.7 kg)   BMI 28.24 kg/m²   Wt Readings from Last 2 Encounters:   02/16/24 147 lb (66.7 kg)   01/04/24 146 lb 2 oz (66.3 kg)     Body mass index is 28.24 kg/m².    Constitutional: well developed, well nourished  Neck/Thyroid: thyroid symmetric, no nodules  Heart:  Regular rate and rhythm  Lungs:  Clear to asculation  Breast: normal without palpable masses, tenderness, asymmetry, nipple discharge, nipple retraction or skin changes  Abdomen:  soft, nontender, nondistended, no masses  Psychiatric:  Oriented to time, place, person and situation. Appropriate mood and affect    Pelvic Exam:  External  Genitalia: normal appearance, hair distribution, and no lesions  Urethral Meatus:  normal in size, location, without lesions and prolapse  Bladder:  No fullness, masses or tenderness  Vagina:  Normal appearance without lesions, no abnormal discharge  Cervix:  Normal without tenderness on motion  Uterus: normal in size, contour, position, mobility, without tenderness  Adnexa: normal without masses or tenderness  Perineum/anus: normal      Assessment & Plan:    Yas Newman is a 48 year old female who presents for an annual physical exam.    1. Encounter for gynecological examination  Pap not done.  ASCCP guidelines reviewed.   Encouraged annual exam.   Order for mammogram to be done 7/2024.   RTC 1 year or prn     2. Encounter for screening mammogram for breast cancer  - [facility] PENELOPE 2D+3D SCREENING BILAT (CPT=77067/16956); Future        Requested Prescriptions      No prescriptions requested or ordered in this encounter         Chely Bateman MD  2/18/2024  8:43 PM

## 2024-03-11 DIAGNOSIS — E66.3 OVERWEIGHT (BMI 25.0-29.9): ICD-10-CM

## 2024-03-11 RX ORDER — PHENTERMINE HYDROCHLORIDE 30 MG/1
30 CAPSULE ORAL EVERY MORNING
Qty: 30 CAPSULE | Refills: 0 | Status: SHIPPED | OUTPATIENT
Start: 2024-03-11 | End: 2024-04-10

## 2024-03-11 NOTE — TELEPHONE ENCOUNTER
Patient called and is requesting refill for medication below said she has one refill left but is not seeing it it in her mediation list, pt is previous pt of , pt is scheduled with  3/28     [id number] - [place], [place] -   [address] [facility] [address],         Medication Detail    Medication Quantity Refills Start End   Phentermine HCl 30 MG Oral Cap (Discontinued) 30 capsule 0 3/5/2024 2/16/2024   Sig:   Take 1 capsule (30 mg total) by mouth every morning.     Patient not taking:   Reported on 2/16/2024     Route:   Oral     Order #:   [id number]

## 2024-03-11 NOTE — TELEPHONE ENCOUNTER
Patient requesting refill of Phentermine.    No protocol. Patient with upcoming appointment with Dr. Freed.    Medication pended for your review and approval.      Future Appointments   Date Time Provider Department Center   3/28/2024  3:30 PM Claudine Fered MD [facility]   5/20/2024  4:00 PM Estefania Loyola APRN [id number]

## 2024-03-28 ENCOUNTER — OFFICE VISIT (OUTPATIENT)
Dept: FAMILY MEDICINE CLINIC | Facility: CLINIC | Age: 49
End: 2024-03-28
Payer: COMMERCIAL

## 2024-03-28 VITALS
BODY MASS INDEX: 30.43 KG/M2 | WEIGHT: 155 LBS | TEMPERATURE: 98 F | HEART RATE: 87 BPM | HEIGHT: 60 IN | OXYGEN SATURATION: 97 % | SYSTOLIC BLOOD PRESSURE: 103 MMHG | DIASTOLIC BLOOD PRESSURE: 71 MMHG

## 2024-03-28 DIAGNOSIS — E66.9 OBESITY WITH BODY MASS INDEX GREATER THAN 30: ICD-10-CM

## 2024-03-28 PROCEDURE — 3008F BODY MASS INDEX DOCD: CPT | Performed by: STUDENT IN AN ORGANIZED HEALTH CARE EDUCATION/TRAINING PROGRAM

## 2024-03-28 PROCEDURE — 99214 OFFICE O/P EST MOD 30 MIN: CPT | Performed by: STUDENT IN AN ORGANIZED HEALTH CARE EDUCATION/TRAINING PROGRAM

## 2024-03-28 PROCEDURE — 3078F DIAST BP <80 MM HG: CPT | Performed by: STUDENT IN AN ORGANIZED HEALTH CARE EDUCATION/TRAINING PROGRAM

## 2024-03-28 PROCEDURE — 3074F SYST BP LT 130 MM HG: CPT | Performed by: STUDENT IN AN ORGANIZED HEALTH CARE EDUCATION/TRAINING PROGRAM

## 2024-03-28 RX ORDER — TOPIRAMATE 25 MG/1
25 TABLET ORAL EVERY EVENING
Qty: 90 TABLET | Refills: 0 | Status: SHIPPED | OUTPATIENT
Start: 2024-03-28

## 2024-03-28 RX ORDER — PHENTERMINE HYDROCHLORIDE 30 MG/1
30 CAPSULE ORAL EVERY MORNING
Qty: 30 CAPSULE | Refills: 0 | Status: SHIPPED | OUTPATIENT
Start: 2024-03-28 | End: 2024-04-27

## 2024-03-28 NOTE — PROGRESS NOTES
HPI:    Patient ID: Yas Newman is a 48 year old female.    HPI  Pt presenting for follow-up. Previously seen by Dr. Parnell.    H/o obesity BMI 30.47 Aug 2023 (156lbs)  H/o postmenopausal  9/2023: 153lb, BMI 29.88  started on phentermine 15mg  10/2023: 152lbs, BMI 29.69  11/2023: increased to 30mg  1/2024: 146lbs (BMI 27.61)  3/2024: 155lbs due to dosing lapse in the last month, vacation  Started [employer]  Difficulty losing weight since menopause age 45  Works as teacher  Increased workout frequency  Has been mindful about strengthening and lean muscle building  Fluctuating diet since vacation, traveling  Recent illness with sore throat  Limited exercise    Single mom with 9yo at home  7lbs in the last few weeks  Sleep issues due to stress  EtOH elimination      Review of Systems   A comprehensive 10 point review of systems was completed.  Pertinent positives and negatives noted in the the HPI.       Current Outpatient Medications   Medication Sig Dispense Refill    Phentermine HCl 30 MG Oral Cap Take 1 capsule (30 mg total) by mouth every morning. 30 capsule 0    topiramate 25 MG Oral Tab Take 1 tablet (25 mg total) by mouth every evening. Consider increasing to twice daily 90 tablet 0    sertraline 25 MG Oral Tab Take 1 tablet (25 mg total) by mouth daily. 90 tablet 1    multivitamin Oral Chew Tab Chew 1 tablet by mouth daily.       Allergies:  Allergies   Allergen Reactions    Seasonal ITCHING and OTHER (SEE COMMENTS)     Watery eyes, sneezing      Vitals:    03/28/24 1527   BP: 103/71   Pulse: 87   Temp: 98.1 °F (36.7 °C)   TempSrc: Temporal   SpO2: 97%   Weight: 155 lb (70.3 kg)   Height: 5' (1.524 m)       Body mass index is 30.27 kg/m².   PHYSICAL EXAM:   Physical Exam  Vitals reviewed.   Constitutional:       General: She is not in acute distress.     Appearance: Normal appearance. She is well-developed.   HENT:      Head: Normocephalic and atraumatic.      Right Ear: Tympanic membrane,  ear canal and external ear normal.      Left Ear: Tympanic membrane, ear canal and external ear normal.   Eyes:      Conjunctiva/sclera: Conjunctivae normal.   Neck:      Thyroid: No thyroid mass or thyroid tenderness.   Cardiovascular:      Rate and Rhythm: Normal rate and regular rhythm.      Pulses: Normal pulses.      Heart sounds: Normal heart sounds, S1 normal and S2 normal. No murmur heard.  Pulmonary:      Effort: Pulmonary effort is normal. No respiratory distress.      Breath sounds: Normal breath sounds. No wheezing, rhonchi or rales.   Abdominal:      General: Bowel sounds are normal.      Palpations: Abdomen is soft.      Tenderness: There is no abdominal tenderness. There is no guarding or rebound.   Musculoskeletal:      Cervical back: Normal range of motion and neck supple. No muscular tenderness.      Right lower leg: No edema.      Left lower leg: No edema.   Lymphadenopathy:      Cervical: No cervical adenopathy.   Skin:     General: Skin is warm and dry.      Coloration: Skin is not jaundiced.   Neurological:      General: No focal deficit present.      Mental Status: She is alert and oriented to person, place, and time. Mental status is at baseline.   Psychiatric:         Attention and Perception: Attention normal.         Mood and Affect: Mood normal.         Behavior: Behavior normal. Behavior is cooperative.         Cognition and Memory: Cognition normal.             ASSESSMENT/PLAN:   1. Obesity with body mass index greater than 30  H/o obesity BMI 30.47 Aug 2023 (156lbs)  H/o postmenopausal  9/2023: 153lb, BMI 29.88  started on phentermine 15mg  10/2023: 152lbs, BMI 29.69  11/2023: increased to 30mg  1/2024: 146lbs (BMI 27.61)  3/2024: 155lbs  Continues to struggle with diet/exercise consistency  Discussed treatment options   - will add Topiramate at bedtime - discussed side effects, dose titration  - discussed healthy diet and lifestyle changes for overall wellness, which includes  decreased carb and sugar intake, increased fiber intake, and increased water intake as tolerated, as well as regular exercise  - counseled on increased protein intake, goal 20-30g/meal  - goal moderate-intensity activity 30min daily / 150min per week  - Phentermine HCl 30 MG Oral Cap; Take 1 capsule (30 mg total) by mouth every morning.  Dispense: 30 capsule; Refill: 0  - topiramate 25 MG Oral Tab; Take 1 tablet (25 mg total) by mouth every evening. Consider increasing to twice daily  Dispense: 90 tablet; Refill: 0    Follow-up in 4-6 weeks for surveillance. Pt verbalized understanding and agrees with plan.      No orders of the defined types were placed in this encounter.      Meds This Visit:  Requested Prescriptions     Signed Prescriptions Disp Refills    Phentermine HCl 30 MG Oral Cap 30 capsule 0     Sig: Take 1 capsule (30 mg total) by mouth every morning.    topiramate 25 MG Oral Tab 90 tablet 0     Sig: Take 1 tablet (25 mg total) by mouth every evening. Consider increasing to twice daily       Imaging & Referrals:  None         ID#[id number]

## 2024-04-01 DIAGNOSIS — E66.3 OVERWEIGHT (BMI 25.0-29.9): ICD-10-CM

## 2024-04-04 RX ORDER — PHENTERMINE HYDROCHLORIDE 30 MG/1
30 CAPSULE ORAL EVERY MORNING
Qty: 30 CAPSULE | Refills: 0 | OUTPATIENT
Start: 2024-04-04 | End: 2024-05-04

## 2024-04-13 ENCOUNTER — OFFICE VISIT (OUTPATIENT)
Dept: FAMILY MEDICINE CLINIC | Facility: CLINIC | Age: 49
End: 2024-04-13
Payer: COMMERCIAL

## 2024-04-13 VITALS
DIASTOLIC BLOOD PRESSURE: 82 MMHG | WEIGHT: 157 LBS | OXYGEN SATURATION: 100 % | SYSTOLIC BLOOD PRESSURE: 116 MMHG | BODY MASS INDEX: 30.82 KG/M2 | TEMPERATURE: 97 F | RESPIRATION RATE: 18 BRPM | HEART RATE: 87 BPM | HEIGHT: 60 IN

## 2024-04-13 DIAGNOSIS — H10.33 ACUTE BACTERIAL CONJUNCTIVITIS OF BOTH EYES: Primary | ICD-10-CM

## 2024-04-13 PROCEDURE — 3008F BODY MASS INDEX DOCD: CPT | Performed by: NURSE PRACTITIONER

## 2024-04-13 PROCEDURE — 3079F DIAST BP 80-89 MM HG: CPT | Performed by: NURSE PRACTITIONER

## 2024-04-13 PROCEDURE — 3074F SYST BP LT 130 MM HG: CPT | Performed by: NURSE PRACTITIONER

## 2024-04-13 PROCEDURE — 99213 OFFICE O/P EST LOW 20 MIN: CPT | Performed by: NURSE PRACTITIONER

## 2024-04-13 RX ORDER — POLYMYXIN B SULFATE AND TRIMETHOPRIM 1; 10000 MG/ML; [USP'U]/ML
1 SOLUTION OPHTHALMIC EVERY 6 HOURS
Qty: 10 ML | Refills: 0 | Status: SHIPPED | OUTPATIENT
Start: 2024-04-13

## 2024-04-13 NOTE — PATIENT INSTRUCTIONS
Conjunctivitis (Pink Eye) is very contagious. This is spread by direct contact after touching your infected eye.   You will be a contagious risk to others until completing at least 24 hours of the antibiotic eye drops   Complete the entire 7 day prescription even after the symptoms have gone away.   Launder you pillow case used last night and tomorrow morning.   Apply a new clean warm moist compress to the affected eye as often as possible today. This is comforting and the warm compress increases the blood flow to the area and promotes healing.   Discard any contact lenses that were worn recently and do not wear contacts for about 7 days.   Discard any eye makeup worn recently.   If you develop any eye pain or vision changes, seek immediate care.  Follow up with your PCP if you do not continue to improve with each day.

## 2024-04-13 NOTE — PROGRESS NOTES
CHIEF COMPLAINT:     Chief Complaint   Patient presents with    Eye Problem     Most likely pink eye - Entered by patient         HPI:   Yas Newman is a 48 year old female who presents with chief complaint of \"pink eye\". Symptoms began  1  days ago. Symptoms have been slowly increasing since onset.   Patient reports right eye redness, eyelid crusting, discharge, itching, slight eyelid swelling, no tearing. Contact lens wearer: denies.   Denies fever, change in vision, sensitivity to light, pain with eye movement, cold symptoms, foreign body sensation, eye injury, or contact with irritant.     Treatments tried: allergy eye drops.   Previous eye surgery: denies  No exposure to COVID       Current Outpatient Medications   Medication Sig Dispense Refill    Phentermine HCl 30 MG Oral Cap Take 1 capsule (30 mg total) by mouth every morning. 30 capsule 0    topiramate 25 MG Oral Tab Take 1 tablet (25 mg total) by mouth every evening. Consider increasing to twice daily 90 tablet 0    sertraline 25 MG Oral Tab Take 1 tablet (25 mg total) by mouth daily. 90 tablet 1    multivitamin Oral Chew Tab Chew 1 tablet by mouth daily.        Past Medical History:    Allergic rhinitis    Anxiety    Fibrocystic breast    Seasonal allergies      Past Surgical History:   Procedure Laterality Date      2015      Family History   Problem Relation Age of Onset    Heart Disorder Mother 72    Other (cad) Mother     Other (etoh abuse) Father     Heart Attack Paternal Grandfather 50    Cancer Neg         breast, ovarian, uterine, cervical, colon      Social History     Socioeconomic History    Marital status: Single   Tobacco Use    Smoking status: Former     Current packs/day: 0.00     Average packs/day: 0.5 packs/day for 3.0 years (1.5 ttl pk-yrs)     Types: Cigarettes     Start date: 1995     Quit date: 1998     Years since quittin.6    Smokeless tobacco: Never   Vaping Use    Vaping status: Never Used    Substance and Sexual Activity    Alcohol use: Not Currently     Comment: quit March 2021 - daily prior, worsened during pandemic - at some point was drinking a bottle of wine.    Drug use: Never    Sexual activity: Not Currently   Other Topics Concern    History of tanning Yes    Reaction to local anesthetic No    Pt has a pacemaker No    Pt has a defibrillator No         REVIEW OF SYSTEMS:   GENERAL: feels well otherwise  SKIN: no rashes  EYES:denies blurred vision or double vision. See HPI  HENT: denies ear pain, congestion, sore throat  LUNGS: denies shortness of breath or cough  CARDIOVASCULAR: denies chest pain or palpitations   GI: denies N/V/C or abdominal pain  NEURO: denies headaches     EXAM:   /82   Pulse 87   Temp 97 °F (36.1 °C) (Temporal)   Resp 18   Ht 5' (1.524 m)   Wt 157 lb (71.2 kg)   SpO2 100%   BMI 30.66 kg/m²   GENERAL: well developed, well nourished,in no apparent distress  SKIN: no rashes,no suspicious lesions  EYES: PERRLA, EOMI, Bilateral conjunctiva erythematous, injected, right>left,  whitish discharge, no tearing,  no lid swelling.  No swelling or redness of periorbital tissue.  Vision corrected - right eye : 20/25, left eye 20/40.     HENT: atraumatic, normocephalic, ears and throat are clear  NECK: supple, non tender  LUNGS: clear to auscultation bilaterally.   CARDIO: RRR without murmur  LYMPH: No preauricular lymphadenopathy. No cervical lymphadenopathy      ASSESSMENT AND PLAN:   Yas Newman is a 48 year old female who presents with:    ASSESSMENT:   Encounter Diagnosis   Name Primary?    Acute bacterial conjunctivitis of both eyes Yes       PLAN: Hygiene and comfort care as listed in patient instructions.    Medication and instructions as listed below  Warm compresses to affected eye prn.  Advised patient to avoid touching eyes; importance stressed of good handwashing.    Risks, benefits, complications and side effects of meds discussed.  Can return to  work/school after on medication for 24 hours.  Follow up with PCP or eye doctor if not improved in 2-3 days    Requested Prescriptions     Signed Prescriptions Disp Refills    polymyxin B-trimethoprim 58706-4.1 UNIT/ML-% Ophthalmic Solution 10 mL 0     Sig: Place 1 drop into both eyes every 6 (six) hours. For 7 days             Patient Instructions   Conjunctivitis (Pink Eye) is very contagious. This is spread by direct contact after touching your infected eye.   You will be a contagious risk to others until completing at least 24 hours of the antibiotic eye drops   Complete the entire 7 day prescription even after the symptoms have gone away.   Launder you pillow case used last night and tomorrow morning.   Apply a new clean warm moist compress to the affected eye as often as possible today. This is comforting and the warm compress increases the blood flow to the area and promotes healing.   Discard any contact lenses that were worn recently and do not wear contacts for about 7 days.   Discard any eye makeup worn recently.   If you develop any eye pain or vision changes, seek immediate care.  Follow up with your PCP if you do not continue to improve with each day.     The patient verbalizes understanding and is in agreement with treatment plan.

## 2024-04-15 ENCOUNTER — PATIENT MESSAGE (OUTPATIENT)
Dept: FAMILY MEDICINE CLINIC | Facility: CLINIC | Age: 49
End: 2024-04-15

## 2024-04-16 NOTE — TELEPHONE ENCOUNTER
From: Yas Newman  To: Claudine Freed  Sent: 4/15/2024 6:51 PM CDT  Subject: Allergy medicine     Dr. Freed,  Claritin which i usually take for seasonal allergies is not cutting it. Symptoms subside a bit but don’t feel like giving the relief it once did.    Wondering - if you suggest other allergy medication that I should switch to.     Symptoms that stick around after taking:   -irritated throat   -itchy throat   -dry cough   -slight headache in the morning once medication is worn off     Thank you,  Yas

## 2024-04-16 NOTE — TELEPHONE ENCOUNTER
Mychart message sent to patient instructing to call nurse triage to speak directly to a nurse regarding symptoms as per department protocol.    Future Appointments   Date Time Provider Department Center   5/9/2024  3:00 PM [facility]   5/20/2024  4:00 PM Estefania Loyola APRN [facility] [id number]   8/10/2024  8:00 AM [facility] EM [facility]

## 2024-04-18 NOTE — TELEPHONE ENCOUNTER
Future Appointments   Date Time Provider Department Center   4/19/2024  2:30 PM [facility]   5/9/2024  3:00 PM [facility]   5/20/2024  4:00 PM Estefania Loyola APRN [facility] [id number]   8/10/2024  8:00 AM [address]

## 2024-04-19 ENCOUNTER — OFFICE VISIT (OUTPATIENT)
Dept: FAMILY MEDICINE CLINIC | Facility: CLINIC | Age: 49
End: 2024-04-19
Payer: COMMERCIAL

## 2024-04-19 VITALS
HEART RATE: 83 BPM | SYSTOLIC BLOOD PRESSURE: 114 MMHG | BODY MASS INDEX: 29.27 KG/M2 | HEIGHT: 61 IN | OXYGEN SATURATION: 97 % | WEIGHT: 155 LBS | DIASTOLIC BLOOD PRESSURE: 77 MMHG

## 2024-04-19 DIAGNOSIS — J30.2 SEASONAL ALLERGIES: Primary | ICD-10-CM

## 2024-04-19 PROCEDURE — 3008F BODY MASS INDEX DOCD: CPT | Performed by: STUDENT IN AN ORGANIZED HEALTH CARE EDUCATION/TRAINING PROGRAM

## 2024-04-19 PROCEDURE — 99213 OFFICE O/P EST LOW 20 MIN: CPT | Performed by: STUDENT IN AN ORGANIZED HEALTH CARE EDUCATION/TRAINING PROGRAM

## 2024-04-19 PROCEDURE — 3078F DIAST BP <80 MM HG: CPT | Performed by: STUDENT IN AN ORGANIZED HEALTH CARE EDUCATION/TRAINING PROGRAM

## 2024-04-19 PROCEDURE — 3074F SYST BP LT 130 MM HG: CPT | Performed by: STUDENT IN AN ORGANIZED HEALTH CARE EDUCATION/TRAINING PROGRAM

## 2024-04-19 RX ORDER — MULTIVITAMIN WITH IRON
250 TABLET ORAL
COMMUNITY

## 2024-04-19 RX ORDER — LORATADINE 10 MG/1
10 TABLET ORAL DAILY
COMMUNITY

## 2024-04-19 NOTE — PATIENT INSTRUCTIONS
Antihistamine strengths  WEAK Claritin/Allegra (blue/red) --> Zyrtec (green) --> Xyzal (orange) STRONG  Can be taken up to 4x/day based on symptoms    Consider Flonase (nasal steroid) dosing 1-2x/day  If refractory symptoms, consider adding Astelin nasal spray (antihistamine)

## 2024-04-19 NOTE — PROGRESS NOTES
HPI:    Patient ID: Yas Newman is a 48 year old female.    HPI  Pt presenting with seasonal allergies.    H/o seasonal allergies  Claritin QAM  Dry cough, headache upon waking  Notes overnight cough  Mild eye irritation  Nasal congestion  Prior nasal spray    Review of Systems   A comprehensive 10 point review of systems was completed.  Pertinent positives and negatives noted in the the HPI.       Current Outpatient Medications   Medication Sig Dispense Refill    loratadine 10 MG Oral Tab Take 1 tablet (10 mg total) by mouth daily.      magnesium 250 MG Oral Tab Take 1 tablet (250 mg total) by mouth.      polymyxin B-trimethoprim 06344-1.1 UNIT/ML-% Ophthalmic Solution Place 1 drop into both eyes every 6 (six) hours. For 7 days 10 mL 0    sertraline 25 MG Oral Tab Take 1 tablet (25 mg total) by mouth daily. 90 tablet 1    multivitamin Oral Chew Tab Chew 1 tablet by mouth daily.       Allergies:  Allergies   Allergen Reactions    Seasonal ITCHING and OTHER (SEE COMMENTS)     Watery eyes, sneezing      Vitals:    04/19/24 1437   BP: 114/77   Pulse: 83   SpO2: 97%   Weight: 155 lb (70.3 kg)   Height: 5' 1\" (1.549 m)       Body mass index is 29.29 kg/m².   PHYSICAL EXAM:   Physical Exam  Vitals reviewed.   Constitutional:       General: She is not in acute distress.     Appearance: Normal appearance. She is well-developed.   HENT:      Head: Normocephalic and atraumatic.      Right Ear: Ear canal and external ear normal. A middle ear effusion is present.      Left Ear: Ear canal and external ear normal. A middle ear effusion is present.      Mouth/Throat:      Comments: Postnasal drip  Eyes:      Conjunctiva/sclera: Conjunctivae normal.   Neck:      Thyroid: No thyroid mass or thyroid tenderness.   Cardiovascular:      Rate and Rhythm: Normal rate and regular rhythm.      Pulses: Normal pulses.      Heart sounds: Normal heart sounds, S1 normal and S2 normal. No murmur heard.  Pulmonary:      Effort:  Pulmonary effort is normal. No respiratory distress.      Breath sounds: Normal breath sounds. No wheezing, rhonchi or rales.   Musculoskeletal:      Cervical back: Normal range of motion and neck supple. No muscular tenderness.      Right lower leg: No edema.      Left lower leg: No edema.   Lymphadenopathy:      Cervical: No cervical adenopathy.   Skin:     General: Skin is warm and dry.      Coloration: Skin is not jaundiced.   Neurological:      General: No focal deficit present.      Mental Status: She is alert and oriented to person, place, and time. Mental status is at baseline.   Psychiatric:         Attention and Perception: Attention normal.         Mood and Affect: Mood normal.         Behavior: Behavior normal. Behavior is cooperative.         Cognition and Memory: Cognition normal.             ASSESSMENT/PLAN:   1. Seasonal allergies  Discussed antihistamine strengths, titrating based on clinical response  Encouraged to restart Flonase dosing  Consider Astelin nasal spray vs Pataday eye drops  - discussed red flags for urgent reevaluation  - to call with any questions/concerns    Pt verbalized understanding and agrees with plan.    No orders of the defined types were placed in this encounter.      Meds This Visit:  Requested Prescriptions      No prescriptions requested or ordered in this encounter       Imaging & Referrals:  None         ID#[id number]

## 2024-05-09 ENCOUNTER — OFFICE VISIT (OUTPATIENT)
Dept: FAMILY MEDICINE CLINIC | Facility: CLINIC | Age: 49
End: 2024-05-09

## 2024-05-09 VITALS
OXYGEN SATURATION: 98 % | WEIGHT: 155 LBS | HEART RATE: 81 BPM | HEIGHT: 61 IN | BODY MASS INDEX: 29.27 KG/M2 | SYSTOLIC BLOOD PRESSURE: 100 MMHG | DIASTOLIC BLOOD PRESSURE: 78 MMHG | TEMPERATURE: 98 F

## 2024-05-09 DIAGNOSIS — E66.9 OBESITY WITH BODY MASS INDEX GREATER THAN 30: Primary | ICD-10-CM

## 2024-05-09 PROCEDURE — 3008F BODY MASS INDEX DOCD: CPT | Performed by: STUDENT IN AN ORGANIZED HEALTH CARE EDUCATION/TRAINING PROGRAM

## 2024-05-09 PROCEDURE — 3078F DIAST BP <80 MM HG: CPT | Performed by: STUDENT IN AN ORGANIZED HEALTH CARE EDUCATION/TRAINING PROGRAM

## 2024-05-09 PROCEDURE — 3074F SYST BP LT 130 MM HG: CPT | Performed by: STUDENT IN AN ORGANIZED HEALTH CARE EDUCATION/TRAINING PROGRAM

## 2024-05-09 PROCEDURE — 99214 OFFICE O/P EST MOD 30 MIN: CPT | Performed by: STUDENT IN AN ORGANIZED HEALTH CARE EDUCATION/TRAINING PROGRAM

## 2024-05-09 RX ORDER — PHENTERMINE HYDROCHLORIDE 30 MG/1
30 CAPSULE ORAL EVERY MORNING
COMMUNITY
End: 2024-05-09

## 2024-05-09 RX ORDER — PHENTERMINE HYDROCHLORIDE 30 MG/1
30 CAPSULE ORAL EVERY MORNING
Qty: 30 CAPSULE | Refills: 0 | Status: SHIPPED | OUTPATIENT
Start: 2024-06-09

## 2024-05-09 RX ORDER — PHENTERMINE HYDROCHLORIDE 30 MG/1
30 CAPSULE ORAL EVERY MORNING
Qty: 30 CAPSULE | Refills: 0 | Status: SHIPPED | OUTPATIENT
Start: 2024-05-09

## 2024-05-09 NOTE — PROGRESS NOTES
HPI:    Patient ID: Yas Newman is a 48 year old female.    HPI  Pt presenting for follow-up.    H/o seasonal allergies  Improved with adjusted antihistamine regimen    H/o obesity BMI 30.47 Aug 2023 (156lbs)  H/o postmenopausal  9/2023: 153lb, BMI 29.88  started on phentermine 15mg  10/2023: 152lbs, BMI 29.69  11/2023: increased to 30mg  1/2024: 146lbs (BMI 27.61)  3/2024: 155lbs due to dosing lapse in the last month, vacation  5/2024: 155lbs -- reports clean, consistent eating  Didn't tolerate Topirmate due to side effects (headache)  Admits to less rigorous exercises due to busy schedule  Started [employer], increased workout frequency  Difficulty losing weight since menopause age 45  Works as teacher    Has been mindful about strengthening and lean muscle building    Review of Systems   A comprehensive 10 point review of systems was completed.  Pertinent positives and negatives noted in the the HPI.         Current Outpatient Medications   Medication Sig Dispense Refill    Phentermine HCl 30 MG Oral Cap Take 1 capsule (30 mg total) by mouth every morning. 30 capsule 0    Dulaglutide 0.75 MG/0.5ML Subcutaneous Solution Pen-injector Inject 0.75 mg into the skin once a week. 4 each 3    [START ON 6/9/2024] Phentermine HCl 30 MG Oral Cap Take 1 capsule (30 mg total) by mouth every morning. 30 capsule 0    loratadine 10 MG Oral Tab Take 1 tablet (10 mg total) by mouth daily.      magnesium 250 MG Oral Tab Take 1 tablet (250 mg total) by mouth.      polymyxin B-trimethoprim 92890-3.1 UNIT/ML-% Ophthalmic Solution Place 1 drop into both eyes every 6 (six) hours. For 7 days 10 mL 0    multivitamin Oral Chew Tab Chew 1 tablet by mouth daily.       Allergies:  Allergies   Allergen Reactions    Seasonal ITCHING and OTHER (SEE COMMENTS)     Watery eyes, sneezing      Vitals:    05/09/24 1504   BP: 100/78   Pulse: 81   Temp: 97.5 °F (36.4 °C)   TempSrc: Temporal   SpO2: 98%   Weight: 155 lb (70.3 kg)   Height:  5' 1\" (1.549 m)       Body mass index is 29.29 kg/m².   PHYSICAL EXAM:   Physical Exam  Vitals reviewed.   Constitutional:       General: She is not in acute distress.     Appearance: Normal appearance. She is well-developed.   HENT:      Head: Normocephalic and atraumatic.   Eyes:      Conjunctiva/sclera: Conjunctivae normal.   Neck:      Thyroid: No thyroid mass or thyroid tenderness.   Cardiovascular:      Rate and Rhythm: Normal rate and regular rhythm.      Pulses: Normal pulses.      Heart sounds: Normal heart sounds, S1 normal and S2 normal. No murmur heard.  Pulmonary:      Effort: Pulmonary effort is normal. No respiratory distress.      Breath sounds: Normal breath sounds. No wheezing, rhonchi or rales.   Abdominal:      General: Bowel sounds are normal.      Palpations: Abdomen is soft.      Tenderness: There is no abdominal tenderness. There is no guarding or rebound.   Musculoskeletal:      Cervical back: Normal range of motion and neck supple. No muscular tenderness.      Right lower leg: No edema.      Left lower leg: No edema.   Lymphadenopathy:      Cervical: No cervical adenopathy.   Skin:     General: Skin is warm and dry.      Coloration: Skin is not jaundiced.   Neurological:      General: No focal deficit present.      Mental Status: She is alert and oriented to person, place, and time. Mental status is at baseline.   Psychiatric:         Attention and Perception: Attention normal.         Mood and Affect: Mood normal.         Behavior: Behavior normal. Behavior is cooperative.         Cognition and Memory: Cognition normal.             ASSESSMENT/PLAN:   1. Obesity with body mass index greater than 30  H/o obesity BMI 30.47 Aug 2023 (156lbs)  H/o postmenopausal  9/2023: 153lb, BMI 29.88  started on phentermine 15mg  10/2023: 152lbs, BMI 29.69  11/2023: increased to 30mg  1/2024: 146lbs (BMI 27.61)  3/2024: 155lbs due to dosing lapse in the last month, vacation  5/2024: 155lbs -- reports clean,  consistent eating  Didn't tolerate Topirmate due to side effects (headache)  - discussed treatment options, including lifestyle medicine, bariatrics, medication -- pt prefers medicine at this time  - discussed healthy diet and lifestyle changes for overall wellness, which includes decreased carb and sugar intake, increased fiber intake, and increased water intake as tolerated, as well as regular exercise  - counseled on increased protein intake, goal 20-30g/meal  - goal moderate-intensity activity 30min daily / 150min per week  - will start Trulicity dosing -- discussed dose titration, side effects  - discussed red flags for urgent reevaluation  - Phentermine HCl 30 MG Oral Cap; Take 1 capsule (30 mg total) by mouth every morning.  Dispense: 30 capsule; Refill: 0  - Dulaglutide 0.75 MG/0.5ML Subcutaneous Solution Pen-injector; Inject 0.75 mg into the skin once a week.  Dispense: 4 each; Refill: 3  - Phentermine HCl 30 MG Oral Cap; Take 1 capsule (30 mg total) by mouth every morning.  Dispense: 30 capsule; Refill: 0    Pt verbalized understanding and agrees with plan.    No orders of the defined types were placed in this encounter.      Meds This Visit:  Requested Prescriptions     Signed Prescriptions Disp Refills    Phentermine HCl 30 MG Oral Cap 30 capsule 0     Sig: Take 1 capsule (30 mg total) by mouth every morning.    Dulaglutide 0.75 MG/0.5ML Subcutaneous Solution Pen-injector 4 each 3     Sig: Inject 0.75 mg into the skin once a week.    Phentermine HCl 30 MG Oral Cap 30 capsule 0     Sig: Take 1 capsule (30 mg total) by mouth every morning.       Imaging & Referrals:  None         ID#[id number]

## 2024-05-13 ENCOUNTER — TELEPHONE (OUTPATIENT)
Dept: FAMILY MEDICINE CLINIC | Facility: CLINIC | Age: 49
End: 2024-05-13

## 2024-05-13 DIAGNOSIS — E66.9 OBESITY WITH BODY MASS INDEX GREATER THAN 30: Primary | ICD-10-CM

## 2024-05-23 RX ORDER — METFORMIN HYDROCHLORIDE 500 MG/1
500 TABLET, EXTENDED RELEASE ORAL 2 TIMES DAILY WITH MEALS
Qty: 60 TABLET | Refills: 1 | Status: SHIPPED | OUTPATIENT
Start: 2024-05-23 | End: 2024-06-22

## 2024-06-06 ENCOUNTER — TELEPHONE (OUTPATIENT)
Dept: PHYSICAL THERAPY | Facility: HOSPITAL | Age: 49
End: 2024-06-06

## 2024-06-06 ENCOUNTER — OFFICE VISIT (OUTPATIENT)
Dept: FAMILY MEDICINE CLINIC | Facility: CLINIC | Age: 49
End: 2024-06-06
Payer: COMMERCIAL

## 2024-06-06 ENCOUNTER — OFFICE VISIT (OUTPATIENT)
Dept: PHYSICAL THERAPY | Age: 49
End: 2024-06-06
Attending: STUDENT IN AN ORGANIZED HEALTH CARE EDUCATION/TRAINING PROGRAM
Payer: COMMERCIAL

## 2024-06-06 VITALS
WEIGHT: 155.81 LBS | HEIGHT: 61 IN | DIASTOLIC BLOOD PRESSURE: 82 MMHG | SYSTOLIC BLOOD PRESSURE: 112 MMHG | OXYGEN SATURATION: 98 % | BODY MASS INDEX: 29.42 KG/M2 | TEMPERATURE: 98 F | HEART RATE: 88 BPM

## 2024-06-06 DIAGNOSIS — M54.9 ACUTE BACK PAIN, UNSPECIFIED BACK LOCATION, UNSPECIFIED BACK PAIN LATERALITY: Primary | ICD-10-CM

## 2024-06-06 DIAGNOSIS — E66.9 OBESITY WITH BODY MASS INDEX GREATER THAN 30: ICD-10-CM

## 2024-06-06 DIAGNOSIS — M79.672 FOOT PAIN, LEFT: ICD-10-CM

## 2024-06-06 PROCEDURE — 3079F DIAST BP 80-89 MM HG: CPT | Performed by: STUDENT IN AN ORGANIZED HEALTH CARE EDUCATION/TRAINING PROGRAM

## 2024-06-06 PROCEDURE — 97161 PT EVAL LOW COMPLEX 20 MIN: CPT

## 2024-06-06 PROCEDURE — 3008F BODY MASS INDEX DOCD: CPT | Performed by: STUDENT IN AN ORGANIZED HEALTH CARE EDUCATION/TRAINING PROGRAM

## 2024-06-06 PROCEDURE — 99213 OFFICE O/P EST LOW 20 MIN: CPT | Performed by: STUDENT IN AN ORGANIZED HEALTH CARE EDUCATION/TRAINING PROGRAM

## 2024-06-06 PROCEDURE — 3074F SYST BP LT 130 MM HG: CPT | Performed by: STUDENT IN AN ORGANIZED HEALTH CARE EDUCATION/TRAINING PROGRAM

## 2024-06-06 PROCEDURE — G2211 COMPLEX E/M VISIT ADD ON: HCPCS | Performed by: STUDENT IN AN ORGANIZED HEALTH CARE EDUCATION/TRAINING PROGRAM

## 2024-06-06 PROCEDURE — 97530 THERAPEUTIC ACTIVITIES: CPT

## 2024-06-06 RX ORDER — PHENTERMINE HYDROCHLORIDE 30 MG/1
30 CAPSULE ORAL EVERY MORNING
Qty: 30 CAPSULE | Refills: 0 | Status: SHIPPED | OUTPATIENT
Start: 2024-07-06

## 2024-06-06 NOTE — PROGRESS NOTES
SPINE EVALUATION:     Diagnosis:   Acute back pain, unspecified back location, unspecified back pain laterality (M54.9)       Referring Provider: Claudine Freed  Date of Evaluation:    6/6/2024    Precautions:  None Next MD visit:   none scheduled  Date of Surgery: n/a     PATIENT SUMMARY   Yas Newman is a 48 year old female who presents to therapy today with complaints of low back pain.  She states that her pain initially started about 6 years ago while twisting in a sitting position.  She states that she saw a chiropractor that provided relief and have been getting chiropractic care intermittently when she would have flare ups. She said she had a recent flare up last Saturday while she was opening her cabinet in her house when she had sudden tightness in her back. She states that her pain was a 9/10 during that time and she went to a chiropractor that provided her relief. She said today she is near pain free starting this morning, but she desires to improve her strength and mobility and education on her pain to avoid future re-aggravation.   Pt describes pain level current 0/10, at best 0/10, at worst 9/10.   Current functional limitations when aggravated include bending, twisting, prolonged sitting..   Yas describes prior level of function IND with all ADLs and work activities. Pt goals include pain free exercise and avoiding future flare up.  Past medical history was reviewed with Yas. Significant findings include   Past Medical History:    Allergic rhinitis    Anxiety    Fibrocystic breast    Seasonal allergies   Pt denies diplopia, dysarthria, dysphasia, dizziness, drop attacks, bowel/bladder changes, saddle anesthesia, and SAIRA LE N/T.    ASSESSMENT  Yas presents to physical therapy evaluation with primary c/o low back pain. The results of the objective tests and measures show hypomobility at L4-5 UPA on left, poor left hip strength and motor control, poor core strength.  Functional  deficits include but are not limited to lifting, bending, sleeping.  Signs and symptoms are consistent with diagnosis of L4-5 facet joint dysfunction on left. Pt and PT discussed evaluation findings, pathology, POC and HEP.  Pt voiced understanding and performs HEP correctly without reported pain. Skilled Physical Therapy is medically necessary to address the above impairments and reach functional goals.     OBJECTIVE:   Observation/Posture: anterior pelvic tilt  Neuro Screen: nl    Lumbar AROM: (* denotes performed with pain)  Flexion: limited 15%  Extension: nl  Sidebending: R limited 25%; L limited 25%  Rotation: R nl; L nl    Accessory motion: hypomobility at L4-5 UPA on left    Palpation: nl    Strength: (* denotes performed with pain)  LE   Hip flexion (L2): R 5/5; L 4-/5  Hip abduction: R 5/5; L 4-/5  Hip Extension: R 5/5; L 4-/5   Hip ER: R 5/5; L 4/5  Hip IR: R 5/5; L 5/5  Knee Flexion: R 5/5; L 4+/5   Knee extension (L3): R 5/5; L 5/5   DF (L4): R 5/5; L 5/5  Great Toe Ext (L5): R 5/5, L 5/5  PF (S1): R 5/5; L 5/5     Flexibility:   LE   Hip Flexor:  L mod       Special tests:   (-) slump test    Gait: pt ambulates on level ground with normal mechanics.    Today’s Treatment and Response:   Pt education was provided on exam findings, treatment diagnosis, treatment plan, expectations, and prognosis. Pt was also provided recommendations for activity modifications, postural corrections, ergonomics, pain science education , detrimental fear avoidance behaviors, and importance of remaining active    Charges: PT Eval Low Complexity, TA 2      Total Timed Treatment: 50 min     Total Treatment Time: 50 min     Based on clinical rationale and outcome measures, this evaluation involved Low Complexity decision making due to 1-2 personal factors/comorbidities, 3 body structures involved/activity limitations, and evolving symptoms including changing pain levels.  PLAN OF CARE:    Goals: (to be met in 6 visits)   Pt will  improve transversus abdominis recruitment to perform proper isometric contraction without requiring verbal or tactile cuing to promote advancement of therex   Pt will demonstrate good understanding of proper posture and body mechanics to decrease pain and improve spinal safety   Pt will have decreased paraspinal mm tension to tolerate participating in a orange theory class  Pt will demonstrate improved core strength to be able to perform squatting with <0/10 pain   Pt will be independent and compliant with comprehensive HEP to maintain progress achieved in PT      Frequency / Duration: Patient will be seen for 1 x/week or a total of 6-8 visits over a 90 day period. Treatment will include: Manual Therapy, Neuromuscular Re-education, Therapeutic Activities, Therapeutic Exercise, and Home Exercise Program instruction    Education or treatment limitation: None  Rehab Potential:excellent    Oswestry Disability Index Score  Score: 42 % (6/6/2024 12:33 PM)      Patient/Family/Caregiver was advised of these findings, precautions, and treatment options and has agreed to actively participate in planning and for this course of care.    Thank you for your referral. Please co-sign or sign and return this letter via fax as soon as possible to [phone]. If you have any questions, please contact me at Dept: [phone]    Sincerely,  Electronically signed by therapist: Prasanth Bateman, PT    Physician's certification required: Yes  I certify the need for these services furnished under this plan of treatment and while under my care.    X___________________________________________________ Date____________________    Certification From: 6/6/2024  To:9/4/2024

## 2024-06-06 NOTE — PROGRESS NOTES
HPI:    Patient ID: Yas Newman is a 48 year old female.    HPI  Pt presenting for follow-up.    Recent recurrent low back pain attributed to end of year stress  Admits to inconsistent stretching due to busy schedule  Restarted Chiro for 4 days with improvement  Feeling better today  Ibuprofen dosing PRN    H/o obesity BMI 30.47 Aug 2023 (156lbs)  H/o postmenopausal  9/2023: 153lb, BMI 29.88  started on phentermine 15mg  10/2023: 152lbs, BMI 29.69  11/2023: increased to 30mg  1/2024: 146lbs (BMI 27.61)  3/2024: 155lbs due to dosing lapse in the last month, vacation  Didn't tolerate Topirmate due to side effects (headache)  5/2024: 155lbs -- reports clean, consistent eating  Trulicity sent -- denied, will need to try/fail Metformin  Has not started Metformin due to concern for side effects during work  Admits to some weight loss but regained with lack of activity related to above    Reports recurrent Left foot pain  FHx poor foot mechanics  Has been wearing supportive shoes  Inconsistent insert use    Reports Right thumb/hand pain/tenderness  Pain with increased computer / phone use  Left handed  Denies any trauma or falls      Review of Systems   A comprehensive 10 point review of systems was completed.  Pertinent positives and negatives noted in the the HPI.       Current Outpatient Medications   Medication Sig Dispense Refill    [START ON 7/6/2024] Phentermine HCl 30 MG Oral Cap Take 1 capsule (30 mg total) by mouth every morning. 30 capsule 0    metFORMIN  MG Oral Tablet 24 Hr Take 1 tablet (500 mg total) by mouth 2 (two) times daily with meals. 60 tablet 1    sertraline 25 MG Oral Tab Take 1 tablet (25 mg total) by mouth daily. 90 tablet 1    Phentermine HCl 30 MG Oral Cap Take 1 capsule (30 mg total) by mouth every morning. 30 capsule 0    Dulaglutide 0.75 MG/0.5ML Subcutaneous Solution Pen-injector Inject 0.75 mg into the skin once a week. 4 each 3    [START ON 6/9/2024] Phentermine HCl 30 MG  Oral Cap Take 1 capsule (30 mg total) by mouth every morning. 30 capsule 0    loratadine 10 MG Oral Tab Take 1 tablet (10 mg total) by mouth daily.      magnesium 250 MG Oral Tab Take 1 tablet (250 mg total) by mouth.      polymyxin B-trimethoprim 52706-8.1 UNIT/ML-% Ophthalmic Solution Place 1 drop into both eyes every 6 (six) hours. For 7 days 10 mL 0    multivitamin Oral Chew Tab Chew 1 tablet by mouth daily.       Allergies:  Allergies   Allergen Reactions    Seasonal ITCHING and OTHER (SEE COMMENTS)     Watery eyes, sneezing      Vitals:    06/06/24 1122   BP: 112/82   Pulse: 88   Temp: 97.6 °F (36.4 °C)   TempSrc: Temporal   SpO2: 98%   Weight: 155 lb 12.8 oz (70.7 kg)   Height: 5' 1\" (1.549 m)       Body mass index is 29.44 kg/m².   PHYSICAL EXAM:   Physical Exam  Vitals reviewed.   Constitutional:       General: She is not in acute distress.  Eyes:      Conjunctiva/sclera: Conjunctivae normal.   Cardiovascular:      Rate and Rhythm: Normal rate.      Pulses: Normal pulses.   Pulmonary:      Effort: Pulmonary effort is normal. No respiratory distress.   Neurological:      General: No focal deficit present.      Mental Status: She is alert and oriented to person, place, and time. Mental status is at baseline.   Psychiatric:         Mood and Affect: Mood normal.         Behavior: Behavior normal.             ASSESSMENT/PLAN:   1. Acute back pain, unspecified back location, unspecified back pain laterality  - encouraged gentle stretching/strengthening exercises  - encouraged to increase hydration and rest as able  - Tylenol/NSAIDs as needed, heat application  - physical therapy as discussed  - to call with any questions or concerns  - Physical Therapy Referral - [facility]    2. Foot pain, left  Discussed role of Podiatry eval  - Podiatry Referral - [facility] ([facility])    3. Obesity with body mass index greater than 30  H/o obesity BMI 30.47 Aug 2023 (156lbs)  H/o postmenopausal  9/2023:  153lb, BMI 29.88  started on phentermine 15mg  10/2023: 152lbs, BMI 29.69  11/2023: increased to 30mg  1/2024: 146lbs (BMI 27.61)  3/2024: 155lbs due to dosing lapse in the last month, vacation  Didn't tolerate Topirmate due to side effects (headache)  5/2024: 155lbs -- reports clean, consistent eating  Trulicity sent -- denied, will need to try/fail Metformin  - discussed healthy diet and lifestyle changes for overall wellness, which includes decreased carb and sugar intake, increased fiber intake, and increased water intake as tolerated, as well as regular exercise  - counseled on increased protein intake, goal 20-30g/meal  - goal moderate-intensity activity 30min daily / 150min per week  - will monitor response to Metformin dosing  - Phentermine HCl 30 MG Oral Cap; Take 1 capsule (30 mg total) by mouth every morning.  Dispense: 30 capsule; Refill: 0    Pt verbalized understanding and agrees with plan.    No orders of the defined types were placed in this encounter.      Meds This Visit:  Requested Prescriptions     Signed Prescriptions Disp Refills    Phentermine HCl 30 MG Oral Cap 30 capsule 0     Sig: Take 1 capsule (30 mg total) by mouth every morning.       Imaging & Referrals:  PHYSICAL THERAPY - INTERNAL  PODIATRY - INTERNAL         ID#[id number]

## 2024-06-11 ENCOUNTER — TELEPHONE (OUTPATIENT)
Dept: PHYSICAL THERAPY | Facility: HOSPITAL | Age: 49
End: 2024-06-11

## 2024-06-13 ENCOUNTER — PATIENT MESSAGE (OUTPATIENT)
Dept: OBGYN CLINIC | Facility: CLINIC | Age: 49
End: 2024-06-13

## 2024-06-13 ENCOUNTER — OFFICE VISIT (OUTPATIENT)
Dept: PHYSICAL THERAPY | Age: 49
End: 2024-06-13
Attending: STUDENT IN AN ORGANIZED HEALTH CARE EDUCATION/TRAINING PROGRAM
Payer: COMMERCIAL

## 2024-06-13 PROCEDURE — 97140 MANUAL THERAPY 1/> REGIONS: CPT

## 2024-06-13 PROCEDURE — 97110 THERAPEUTIC EXERCISES: CPT

## 2024-06-13 PROCEDURE — 97112 NEUROMUSCULAR REEDUCATION: CPT

## 2024-06-13 NOTE — TELEPHONE ENCOUNTER
From: Yas Newman  To: Chely Bateman  Sent: 6/13/2024 2:21 PM CDT  Subject: Re: estrogen     Dr. Bateman,  I would like to discuss hormone replacement therapy, specifically estrogen. As I am in post menopause and would like to discuss this option based.     Does this require an appointment? I tried to schedule an appointment and nothing available till October.     Thank you,  Yas

## 2024-06-13 NOTE — PROGRESS NOTES
Diagnosis:   Acute back pain, unspecified back location, unspecified back pain laterality (M54.9)        Referring Provider: Claudine Freed  Date of Evaluation:    6/6/2024    Precautions:  None Next MD visit:   none scheduled  Date of Surgery: n/a   Insurance Primary/Secondary: [employer] [place] HMO / N/A     # Auth Visits: 6 visits            Subjective: Have not had pain with last session. Been bike riding more     Pain: 0/10      Objective:   - kristi hip flexor tightness    Strength: (* denotes performed with pain)  LE   Hip flexion (L2): R 5/5; L 4-/5  Hip abduction: R 5/5; L 4-/5  Hip Extension: R 5/5; L 4-/5            Hip ER: R 5/5; L 4/5  Hip IR: R 5/5; L 5/5  Knee Flexion: R 5/5; L 4+/5            Knee extension (L3): R 5/5; L 5/5            DF (L4): R 5/5; L 5/5  Great Toe Ext (L5): R 5/5, L 5/5  PF (S1): R 5/5; L 5/5     Accessory motion: hypomobility at L4-5 UPA on left        Assessment: initiated motor control exercises for lumbar neutral position with hip loading.  Pt with poor core stability that is contributed by hip flexor tightness.  Discussed improving hip mobility and core strength to improve       Goals:  (to be met in 6 visits)   Pt will improve transversus abdominis recruitment to perform proper isometric contraction without requiring verbal or tactile cuing to promote advancement of therex   Pt will demonstrate good understanding of proper posture and body mechanics to decrease pain and improve spinal safety   Pt will have decreased paraspinal mm tension to tolerate participating in a orange theory class  Pt will demonstrate improved core strength to be able to perform squatting with <0/10 pain   Pt will be independent and compliant with comprehensive HEP to maintain progress achieved in PT      Plan: Continue with manual therapy for pain modulation, progress core and hip stability exercise  Date: 6/13/2024  TX#: 2/6 Date:                 TX#: 3/ Date:                 TX#: 4/ Date:                  TX#: 5/ Date:   Tx#: 6/   MT  - UPA at L L4-5 facet, grade III, several minutes       TE  - Prone press up with glute set, 2x10       NMRE  - Glute bridge with neutral spine, 5s hold x 10, 2 sets  - Chair squat with neutral pelvic and hip extension, 3x8, tactile cues provided  - Side plank on knee, cues for technique, 10s hold x 3 each side              HEP: Glute bridge with neutral spine, 5s hold x 10, 2 sets  - Chair squat with neutral pelvic and hip extension, 3x8, tactile cues provided  -Side plank on knee, cues for technique, 10s hold x 3 each side  - Prone press up with glute set    Charges: MT 1, NMRE 2, TE 1       Total Timed Treatment: MT 10 min, NMRE 25 min, TE 8 min  Total Treatment Time: 43 min

## 2024-06-17 ENCOUNTER — OFFICE VISIT (OUTPATIENT)
Dept: OBGYN CLINIC | Facility: CLINIC | Age: 49
End: 2024-06-17

## 2024-06-17 VITALS
SYSTOLIC BLOOD PRESSURE: 117 MMHG | DIASTOLIC BLOOD PRESSURE: 78 MMHG | HEART RATE: 84 BPM | BODY MASS INDEX: 29 KG/M2 | WEIGHT: 153 LBS

## 2024-06-17 DIAGNOSIS — N95.1 SYMPTOMATIC MENOPAUSAL OR FEMALE CLIMACTERIC STATES: Primary | ICD-10-CM

## 2024-06-17 PROCEDURE — 99213 OFFICE O/P EST LOW 20 MIN: CPT | Performed by: OBSTETRICS & GYNECOLOGY

## 2024-06-17 PROCEDURE — 3078F DIAST BP <80 MM HG: CPT | Performed by: OBSTETRICS & GYNECOLOGY

## 2024-06-17 PROCEDURE — 3074F SYST BP LT 130 MM HG: CPT | Performed by: OBSTETRICS & GYNECOLOGY

## 2024-06-18 ENCOUNTER — PATIENT MESSAGE (OUTPATIENT)
Dept: OBGYN CLINIC | Facility: CLINIC | Age: 49
End: 2024-06-18

## 2024-06-18 NOTE — TELEPHONE ENCOUNTER
From: Yas Newman  To: Machelle Jacobsen  Sent: 6/18/2024 11:46 AM CDT  Subject: Follow-up to appointment     Dr. Jacobsen,  It was truly a pleasure to have met with yesterday and your knowledge of menopause is truly appreciated. Thank you for listening to my concerns and hearing me!    I am following up on the HRT recommendations- when will those be reflected in my chart & sent to pharmacy?    In addition, to additional testing we discussed- believe decoscan? If inaccurate name- it was referring to my bone health. When will that be reflected so I can schedule an appointment?     Thank you,  Yas

## 2024-06-18 NOTE — TELEPHONE ENCOUNTER
Patient was seen in office yesterday.  Notes from visit are not in yet.  Message to Dr. Jacobsen regarding prescription for HRT and order for dexascan.

## 2024-06-19 RX ORDER — PROGESTERONE 100 MG/1
100 CAPSULE ORAL NIGHTLY
Qty: 90 CAPSULE | Refills: 1 | Status: SHIPPED | OUTPATIENT
Start: 2024-06-19 | End: 2025-06-19

## 2024-06-19 RX ORDER — ESTRADIOL 0.05 MG/D
1 PATCH TRANSDERMAL WEEKLY
Qty: 24 PATCH | Refills: 0 | Status: SHIPPED | OUTPATIENT
Start: 2024-06-19

## 2024-06-19 NOTE — PROGRESS NOTES
Yas Newman    1975       Chief Complaint   Patient presents with    Gyn Problem     Discuss postmenopause    Discussed options for treatment of symptomatic menopause.  Risks,benefits and possible complications of HRT discussed including risk of breast cancer, DVT and CV risks.  Patient has no contraindications.      Past Medical History:    Allergic rhinitis    Anxiety    Fibrocystic breast    Seasonal allergies       Past Surgical History:   Procedure Laterality Date      2015       Menstrual History:  OB History    Para Term  AB Living   1 1 1 0 0 1   SAB IAB Ectopic Multiple Live Births   0 0 0 0 1        No LMP recorded. (Menstrual status: Menopause).        Hx Prior Abnormal Pap: No  Pap Date: 06/15/22  Pap Result Notes: NEG PAP / NEG HPV  Follow Up Recommendation: MAMMO BILATERAL 23 INCOMPLETE         8/10/23 LEFT neg asses.     PHYSICAL EXAM:       Constitutional: well developed, well nourished    Psychiatric:  Oriented to time, place, person and situation. Appropriate mood and affect        Yas was seen today for gyn problem.    Diagnoses and all orders for this visit:    Symptomatic menopausal or female climacteric states    Other orders  -     estradiol 0.05 MG/24HR Transdermal Patch Weekly; Place 1 patch onto the skin once a week.  -     progesterone (PROMETRIUM) 100 MG Oral Cap; Take 1 capsule (100 mg total) by mouth nightly.    Patient opted for HRT- follow up with me in 3 months for follow up.

## 2024-06-20 ENCOUNTER — TELEPHONE (OUTPATIENT)
Dept: FAMILY MEDICINE CLINIC | Facility: CLINIC | Age: 49
End: 2024-06-20

## 2024-06-26 NOTE — TELEPHONE ENCOUNTER
HRT is in my note and sent to pharmacy on the date that she was seen.  Please have her check with her pharmacy and if it is not there then please see my note and send again, thanks.  We will wait to do the dexascan due to  her younger age.

## 2024-06-27 ENCOUNTER — TELEPHONE (OUTPATIENT)
Dept: PHYSICAL THERAPY | Facility: HOSPITAL | Age: 49
End: 2024-06-27

## 2024-07-01 ENCOUNTER — APPOINTMENT (OUTPATIENT)
Dept: PHYSICAL THERAPY | Age: 49
End: 2024-07-01
Attending: STUDENT IN AN ORGANIZED HEALTH CARE EDUCATION/TRAINING PROGRAM
Payer: COMMERCIAL

## 2024-07-08 ENCOUNTER — APPOINTMENT (OUTPATIENT)
Dept: PHYSICAL THERAPY | Age: 49
End: 2024-07-08
Attending: STUDENT IN AN ORGANIZED HEALTH CARE EDUCATION/TRAINING PROGRAM
Payer: COMMERCIAL

## 2024-07-10 ENCOUNTER — OFFICE VISIT (OUTPATIENT)
Dept: PHYSICAL THERAPY | Age: 49
End: 2024-07-10
Attending: STUDENT IN AN ORGANIZED HEALTH CARE EDUCATION/TRAINING PROGRAM
Payer: COMMERCIAL

## 2024-07-10 PROCEDURE — 97110 THERAPEUTIC EXERCISES: CPT

## 2024-07-10 NOTE — PROGRESS NOTES
Diagnosis:   Acute back pain, unspecified back location, unspecified back pain laterality (M54.9)        Referring Provider: Claudine Freed  Date of Evaluation:    6/6/2024    Precautions:  None Next MD visit:   none scheduled  Date of Surgery: n/a   Insurance Primary/Secondary: [employer] [place] HMO / N/A     # Auth Visits: 6 visits            Subjective: Patient states that she feels good. Patient states that she has been doing the exercises and stretching. States that every now and then she will have flare-ups with stress. Patient states that she hasn't had any pain since she last came to therapy.     Pain: 0/10      Objective:   - kristi hip flexor tightness    Strength: (* denotes performed with pain)  LE   Hip flexion (L2): R 5/5; L 4-/5  Hip abduction: R 5/5; L 4-/5  Hip Extension: R 5/5; L 4-/5            Hip ER: R 5/5; L 4/5  Hip IR: R 5/5; L 5/5  Knee Flexion: R 5/5; L 4+/5            Knee extension (L3): R 5/5; L 5/5            DF (L4): R 5/5; L 5/5  Great Toe Ext (L5): R 5/5, L 5/5  PF (S1): R 5/5; L 5/5     Accessory motion: hypomobility at L4-5 UPA on left        Assessment: Patient presenting pain free at baseline and with activity. Reviewed home exercise program and discussed progressions to continue with glute and core strengthening. Patient progressing appropriately through plan of care.       Goals:  (to be met in 6 visits)   Pt will improve transversus abdominis recruitment to perform proper isometric contraction without requiring verbal or tactile cuing to promote advancement of therex   Pt will demonstrate good understanding of proper posture and body mechanics to decrease pain and improve spinal safety   Pt will have decreased paraspinal mm tension to tolerate participating in a orange theory class  Pt will demonstrate improved core strength to be able to perform squatting with <0/10 pain   Pt will be independent and compliant with comprehensive HEP to maintain progress achieved in PT      Plan:  Continue with manual therapy for pain modulation, progress core and hip stability exercise  Date: 6/13/2024  TX#: 2/6 Date: 7/10/2024              TX#: 3/6 Date:                 TX#: 4/ Date:                 TX#: 5/ Date:   Tx#: 6/   MT  - UPA at L L4-5 facet, grade III, several minutes TE  - Glute bridge, 5x hold 2x10  - Prone press up with glute set x20  - R/L side plank 3x10s   - Chair squat 3x8   - Glute bridge + R/L alternating march to fatigue   - Hip hinge to fatigue       TE  - Prone press up with glute set, 2x10       NMRE  - Glute bridge with neutral spine, 5s hold x 10, 2 sets  - Chair squat with neutral pelvic and hip extension, 3x8, tactile cues provided  - Side plank on knee, cues for technique, 10s hold x 3 each side              HEP: Glute bridge with neutral spine, 5s hold x 10, 2 sets  - Chair squat with neutral pelvic and hip extension, 3x8, tactile cues provided  -Side plank on knee, cues for technique, 10s hold x 3 each side  - Prone press up with glute set    Charges: TE 3       Total Timed Treatment: 45 min  Total Treatment Time: 45 min

## 2024-07-12 ENCOUNTER — PATIENT MESSAGE (OUTPATIENT)
Dept: OBGYN CLINIC | Facility: CLINIC | Age: 49
End: 2024-07-12

## 2024-07-12 DIAGNOSIS — E66.9 OBESITY WITH BODY MASS INDEX GREATER THAN 30: ICD-10-CM

## 2024-07-13 NOTE — TELEPHONE ENCOUNTER
From: Yas Newman  To: Machelle Jacobsen  Sent: 7/12/2024 7:18 PM CDT  Subject: Affixing Patch    Dr. Jacobsen, I had a 2 patches not affix properly- didn't realize the complete dryness of skin needed & beware of lotion on skin. Learn d now- but i will need refill 2 weeks early when i get to the end of my current prescription. How do i go about that?     Thank you,   Yas

## 2024-07-15 ENCOUNTER — HOSPITAL ENCOUNTER (OUTPATIENT)
Dept: GENERAL RADIOLOGY | Age: 49
Discharge: HOME | End: 2024-07-15
Attending: STUDENT IN AN ORGANIZED HEALTH CARE EDUCATION/TRAINING PROGRAM
Payer: COMMERCIAL

## 2024-07-15 ENCOUNTER — PATIENT MESSAGE (OUTPATIENT)
Dept: FAMILY MEDICINE CLINIC | Facility: CLINIC | Age: 49
End: 2024-07-15

## 2024-07-15 ENCOUNTER — OFFICE VISIT (OUTPATIENT)
Dept: PODIATRY CLINIC | Facility: CLINIC | Age: 49
End: 2024-07-15
Payer: COMMERCIAL

## 2024-07-15 DIAGNOSIS — M77.42 METATARSALGIA OF LEFT FOOT: ICD-10-CM

## 2024-07-15 DIAGNOSIS — M79.671 RIGHT FOOT PAIN: Primary | ICD-10-CM

## 2024-07-15 DIAGNOSIS — M79.671 RIGHT FOOT PAIN: ICD-10-CM

## 2024-07-15 PROCEDURE — 99203 OFFICE O/P NEW LOW 30 MIN: CPT | Performed by: STUDENT IN AN ORGANIZED HEALTH CARE EDUCATION/TRAINING PROGRAM

## 2024-07-15 PROCEDURE — 73630 X-RAY EXAM OF FOOT: CPT | Performed by: STUDENT IN AN ORGANIZED HEALTH CARE EDUCATION/TRAINING PROGRAM

## 2024-07-15 RX ORDER — MELOXICAM 15 MG/1
15 TABLET ORAL
Qty: 30 TABLET | Refills: 1 | Status: SHIPPED | OUTPATIENT
Start: 2024-07-15

## 2024-07-15 NOTE — PROGRESS NOTES
[facility] Podiatry  Progress Note      Yas Newman is a 49 year old female.   Chief Complaint   Patient presents with    Foot Pain     Left - onset a couple of years ago - no injury - has pain in the ball of the foot with activities rated as 8/10 on and off - no x-rays              HPI:     Patient is a pleasant 49-year-old female presents to clinic with complaints of pain along the ball of her foot pain along the ball of her left foot.  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  Admits to relief she is able to rest her feet. Denies any injuries or trauma. Denies walking barefoot in the house.       Allergies: Seasonal    Current Outpatient Medications   Medication Sig Dispense Refill    Meloxicam 15 MG Oral Tab Take 1 tablet (15 mg total) by mouth daily with dinner. 30 tablet 1    estradiol 0.05 MG/24HR Transdermal Patch Weekly Place 1 patch onto the skin once a week. 24 patch 0    progesterone (PROMETRIUM) 100 MG Oral Cap Take 1 capsule (100 mg total) by mouth nightly. 90 capsule 1    Phentermine HCl 30 MG Oral Cap Take 1 capsule (30 mg total) by mouth every morning. 30 capsule 0    sertraline 25 MG Oral Tab Take 1 tablet (25 mg total) by mouth daily. 90 tablet 1    Dulaglutide 0.75 MG/0.5ML Subcutaneous Solution Pen-injector Inject 0.75 mg into the skin once a week. 4 each 3    magnesium 250 MG Oral Tab Take 1 tablet (250 mg total) by mouth.      multivitamin Oral Chew Tab Chew 1 tablet by mouth daily.        Past Medical History:    Allergic rhinitis    Anxiety    Fibrocystic breast    Seasonal allergies      Past Surgical History:   Procedure Laterality Date      2015      Family History   Problem Relation Age of Onset    Heart Disorder Mother 72    Other (cad) Mother     Other (etoh abuse) Father     Heart Attack Paternal Grandfather 50    Cancer Neg         breast, ovarian, uterine, cervical, colon      Social History     Socioeconomic History     Marital status: Single   Tobacco Use    Smoking status: Former     Current packs/day: 0.00     Average packs/day: 0.5 packs/day for 3.0 years (1.5 ttl pk-yrs)     Types: Cigarettes     Start date: 1995     Quit date: 1998     Years since quittin.8    Smokeless tobacco: Never   Vaping Use    Vaping status: Never Used   Substance and Sexual Activity    Alcohol use: Not Currently     Comment: quit 2021 - daily prior, worsened during pandemic - at some point was drinking a bottle of wine.    Drug use: Never    Sexual activity: Not Currently   Other Topics Concern    History of tanning Yes    Reaction to local anesthetic No    Pt has a pacemaker No    Pt has a defibrillator No           REVIEW OF SYSTEMS:     Denies nause, fever, chills  No calf pain  Denies chest pain or SOB      EXAM:   There were no vitals taken for this visit.  GENERAL: well developed, well nourished, in no apparent distress  EXTREMITIES:   1. Integument: Normal skin temperature and turgor  2. Vascular: Dorsalis pedis two out of four bilateral and posterior tibial pulses two out of   four bilateral, capillary refill normal.   3. Musculoskeletal: All muscle groups are graded 5 out of 5 in the foot and ankle. Pain across metatarsals 1-5 of left foot.    4. Neurological: Normal sharp dull sensation; reflexes normal.             ASSESSMENT AND PLAN:   Diagnoses and all orders for this visit:    Right foot pain  -     XR FOOT, COMPLETE (MIN 3 VIEWS), RIGHT (CPT=73630); Future    Metatarsalgia of left foot    Other orders  -     Meloxicam 15 MG Oral Tab; Take 1 tablet (15 mg total) by mouth daily with dinner.        Plan:     Patient seen and examined and findings discussed with patient.  Discussed etiology of condition along with treatment options.  Recommend taking meloxicam as needed.  Ambulate in supportive shoes and avoid walking barefoot.  Recommend the patient visits [employer] store to purchase extrawide  forefoot gym shoes.  Recommend using a frozen water bottle to roll on the plantar aspect of her foot while seated with socks on once daily for 15 to 20 minutes.  Return to clinic as needed.    The patient indicates understanding of these issues and agrees to the plan.        Mervat Ma DPM

## 2024-07-16 ENCOUNTER — OFFICE VISIT (OUTPATIENT)
Dept: PHYSICAL THERAPY | Age: 49
End: 2024-07-16
Attending: STUDENT IN AN ORGANIZED HEALTH CARE EDUCATION/TRAINING PROGRAM
Payer: COMMERCIAL

## 2024-07-16 PROCEDURE — 97110 THERAPEUTIC EXERCISES: CPT

## 2024-07-16 PROCEDURE — 97530 THERAPEUTIC ACTIVITIES: CPT

## 2024-07-16 RX ORDER — PHENTERMINE HYDROCHLORIDE 30 MG/1
30 CAPSULE ORAL EVERY MORNING
Qty: 30 CAPSULE | Refills: 0 | OUTPATIENT
Start: 2024-07-16

## 2024-07-16 NOTE — TELEPHONE ENCOUNTER
From: Yas Newman  To: Claudine Freed  Sent: 7/15/2024 3:04 PM CDT  Subject: Additional referral request for right foot    Dr. Freed,   Can you please put in my right foot as being affected by a strain in June while at the beach. Need that looked at- too but meed added to referral for podiatrist.     Thank you,   Yas

## 2024-07-16 NOTE — TELEPHONE ENCOUNTER
Please see patient E-mail and advise.    Patient had x-ray of right foot yesterday with the podiatrist.

## 2024-07-16 NOTE — PROGRESS NOTES
Diagnosis:   Acute back pain, unspecified back location, unspecified back pain laterality (M54.9)        Referring Provider: Claudine Freed  Date of Evaluation:    6/6/2024    Precautions:  None Next MD visit:   none scheduled  Date of Surgery: n/a   Insurance Primary/Secondary: [employer] [place] HMO / N/A     # Auth Visits: 6 visits            Subjective: compliant with exercises and not having any issues right now, but concerned that pain will come back when school ramps back up    Pain: 0/10      Objective:   - kristi hip flexor tightness    Strength: (* denotes performed with pain)  LE   Hip flexion (L2): R 5/5; L 4-/5  Hip abduction: R 5/5; L 4-/5  Hip Extension: R 5/5; L 4-/5            Hip ER: R 5/5; L 4/5  Hip IR: R 5/5; L 5/5  Knee Flexion: R 5/5; L 4+/5            Knee extension (L3): R 5/5; L 5/5            DF (L4): R 5/5; L 5/5  Great Toe Ext (L5): R 5/5, L 5/5  PF (S1): R 5/5; L 5/5     Accessory motion: hypomobility at L4-5 UPA on left        Assessment: Progressed compound movement exercises adding squat, hinge, and lunge pattern with neutral pelvis focus. Tolerated very well with no increase in pain. Discussed continual progression of core stability with compound movement exercises focused on pelvic control      Goals:  (to be met in 6 visits)   Pt will improve transversus abdominis recruitment to perform proper isometric contraction without requiring verbal or tactile cuing to promote advancement of therex   Pt will demonstrate good understanding of proper posture and body mechanics to decrease pain and improve spinal safety   Pt will have decreased paraspinal mm tension to tolerate participating in a orange theory class  Pt will demonstrate improved core strength to be able to perform squatting with <0/10 pain   Pt will be independent and compliant with comprehensive HEP to maintain progress achieved in PT      Plan: Continue with manual therapy for pain modulation, progress core and hip stability  exercise  Date: 6/13/2024  TX#: 2/6 Date: 7/10/2024              TX#: 3/6 Date:  7/16/24               TX#: 4/6 Date:                 TX#: 5/ Date:   Tx#: 6/   MT  - UPA at L L4-5 facet, grade III, several minutes TE  - Glute bridge, 5x hold 2x10  - Prone press up with glute set x20  - R/L side plank 3x10s   - Chair squat 3x8   - Glute bridge + R/L alternating march to fatigue   - Hip hinge to fatigue  TE  - Glute bridge, 10s hold x 10  - Side plank on knee, 10s x 2 each side  - Lateral lunge, 2x10  - Split squat, 1x5 each side     TE  - Prone press up with glute set, 2x10  TA  - KB dead lift, cues for hip extension bias, #20, 2x10  - KB goblet squat, #20 2x10  - KB lateral lunge, #20, 2x5 each side       NMRE  - Glute bridge with neutral spine, 5s hold x 10, 2 sets  - Chair squat with neutral pelvic and hip extension, 3x8, tactile cues provided  - Side plank on knee, cues for technique, 10s hold x 3 each side              HEP: Glute bridge with neutral spine, 5s hold x 10, 2 sets  - Chair squat with neutral pelvic and hip extension, 3x8, tactile cues provided  -Side plank on knee, cues for technique, 10s hold x 3 each side  - Prone press up with glute set    Charges: TE 2, TA 2       Total Timed Treatment: TE 23 min, TA 23 min  Total Treatment Time: 46 min

## 2024-07-18 DIAGNOSIS — E66.9 OBESITY WITH BODY MASS INDEX GREATER THAN 30: ICD-10-CM

## 2024-07-22 RX ORDER — PHENTERMINE HYDROCHLORIDE 30 MG/1
30 CAPSULE ORAL EVERY MORNING
Qty: 30 CAPSULE | Refills: 0 | Status: SHIPPED | OUTPATIENT
Start: 2024-07-22

## 2024-07-22 NOTE — TELEPHONE ENCOUNTER
Please review. Protocol Failed; No Protocol      Recent fills: 4/9/2024, 5/9/2024, 6/7/2024  Last Rx written: 6/6/2024  Last office visit: 6/6/2024      Future Appointments  Date Type Provider Dept   09/04/24 Appointment Claudine Freed MD [facility]   Showing future appointments within next 150 days with a meds authorizing provider and meeting all other requirements        Requested Prescriptions   Pending Prescriptions Disp Refills    PHENTERMINE HCL 30 MG Oral Cap [Pharmacy Med Name: PHENTERMINE HCL 30MG CAPSULES] 30 capsule 0     Sig: TAKE 1 CAPSULE(30 MG) BY MOUTH EVERY MORNING       Controlled Substance Medication Failed - 7/18/2024  9:07 AM        Failed - This medication is a controlled substance - forward to provider to refill               Future Appointments         Provider Department Appt Notes    In 2 weeks Saran Shepard DPM [facility], [address] [place] [address] pt has seen Dr. Crowell previously 7/17 would like to see dr. shepard and stay with him   AH-Referral put in & right foot exrayed- want to discuss results.  Follow-up from left foot.    In 4 weeks Prasanth Bateman, PT [facility] 5 visits 6/6 to 9/6  BCBS HMO  no c/p, 60v pcy    In 1 month WDR DEXA RM1; WDR GLEN RM1 [facility]     In 1 month Claudine Freed MD [facility] 3 mon f/u    In 1 month [facility] discuss  symptoms/ possible HRT          Recent Outpatient Visits              6 days ago     [facility] Prasanth Bateman, PT    Office Visit    1 week ago Right foot pain    [facility] Mervat Ma DPM    Office Visit    1 week ago     [facility] Liliam Fine, PT    Office Visit    1 month  ago Symptomatic menopausal or female climacteric states    [facility], [facility] - OB/GYN Machelle Jacobsen MD    Office Visit    1 month ago     [facility] Prasanth Bateman, PT    Office Visit

## 2024-07-24 ENCOUNTER — TELEPHONE (OUTPATIENT)
Dept: INTERNAL MEDICINE CLINIC | Facility: CLINIC | Age: 49
End: 2024-07-24

## 2024-07-24 ENCOUNTER — TELEPHONE (OUTPATIENT)
Dept: PHYSICAL THERAPY | Facility: HOSPITAL | Age: 49
End: 2024-07-24

## 2024-08-02 ENCOUNTER — TELEPHONE (OUTPATIENT)
Dept: PHYSICAL THERAPY | Facility: HOSPITAL | Age: 49
End: 2024-08-02

## 2024-08-07 ENCOUNTER — OFFICE VISIT (OUTPATIENT)
Facility: LOCATION | Age: 49
End: 2024-08-07
Payer: COMMERCIAL

## 2024-08-07 DIAGNOSIS — S99.921A RIGHT FOOT INJURY, INITIAL ENCOUNTER: Primary | ICD-10-CM

## 2024-08-07 DIAGNOSIS — M20.41 HAMMERTOES OF BOTH FEET: ICD-10-CM

## 2024-08-07 DIAGNOSIS — M20.42 HAMMERTOES OF BOTH FEET: ICD-10-CM

## 2024-08-07 PROCEDURE — 99213 OFFICE O/P EST LOW 20 MIN: CPT | Performed by: PODIATRIST

## 2024-08-07 NOTE — PROGRESS NOTES
[facility]  Progress Note    Yas Newman is a 49 year old female.   Chief Complaint   Patient presents with    Foot Pain     New pt- R foot- onset- 6/2024-  she think she pulled back her foot getting [place] of the tube [place] the beach-  rates pain 1/10 on and off- has xray in the system - Previous patient of Dr Ma, LOV 7/15/24.          HPI:     Patient is a pleasant 49-year-old female who is presenting to clinic today to discuss recent right foot injury and to establish care.  Patient previously saw Dr. Varma for another issue.  She states that in June of this year she was in [place] and states that she hyperflexed her toes while she was in [place].  She did have extreme pain following the injury underneath her right second digit, which has improved and stating that she has no current pain.  Patient has obtained new supportive Hoka's, which she wears most of the time.  She also has Oofos, which she is wearing today.  Today in clinic, she is denying any pain.  She is hoping to establish care.  No other concerns.  Denies recent nausea, vomiting, fevers, chills.      Allergies: Seasonal   Current Outpatient Medications   Medication Sig Dispense Refill    Phentermine HCl 30 MG Oral Cap Take 1 capsule (30 mg total) by mouth every morning. 30 capsule 0    Meloxicam 15 MG Oral Tab Take 1 tablet (15 mg total) by mouth daily with dinner. 30 tablet 1    estradiol 0.05 MG/24HR Transdermal Patch Weekly Place 1 patch onto the skin once a week. 24 patch 0    progesterone (PROMETRIUM) 100 MG Oral Cap Take 1 capsule (100 mg total) by mouth nightly. 90 capsule 1    sertraline 25 MG Oral Tab Take 1 tablet (25 mg total) by mouth daily. 90 tablet 1    Dulaglutide 0.75 MG/0.5ML Subcutaneous Solution Pen-injector Inject 0.75 mg into the skin once a week. 4 each 3    magnesium 250 MG Oral Tab Take 1 tablet (250 mg total) by mouth.      multivitamin Oral Chew Tab Chew 1 tablet by mouth daily.         Past Medical History:    Allergic rhinitis    Anxiety    Fibrocystic breast    Seasonal allergies      Past Surgical History:   Procedure Laterality Date      2015      Family History   Problem Relation Age of Onset    Heart Disorder Mother 72    Other (cad) Mother     Other (etoh abuse) Father     Heart Attack Paternal Grandfather 50    Cancer Neg         breast, ovarian, uterine, cervical, colon      Social History     Socioeconomic History    Marital status: Single   Tobacco Use    Smoking status: Former     Current packs/day: 0.00     Average packs/day: 0.5 packs/day for 3.0 years (1.5 ttl pk-yrs)     Types: Cigarettes     Start date: 1995     Quit date: 1998     Years since quittin.9    Smokeless tobacco: Never   Vaping Use    Vaping status: Never Used   Substance and Sexual Activity    Alcohol use: Not Currently     Comment: quit 2021 - daily prior, worsened during pandemic - at some point was drinking a bottle of wine.    Drug use: Never    Sexual activity: Not Currently   Other Topics Concern    History of tanning Yes    Reaction to local anesthetic No    Pt has a pacemaker No    Pt has a defibrillator No           REVIEW OF SYSTEMS:     10 point ROS completed and was negative, except for pertinent positive and negatives stated in subjective.       EXAM:     Right lower extremity focused exam  GENERAL: well developed, well nourished, in no apparent distress  EXTREMITIES:  1. Integument: Skin appears moist, warm, and supple with positive hair growth. There are no color changes. No open lesions. No macerations. No Hyperkeratotic lesions.   2. Vascular: Dorsalis pedis 2/4 bilateral and posterior tibial pulses 2/4 bilateral, capillary refill normal.  3. Neurological: Gross sensation intact via light touch bilaterally.  Normal sharp/dull sensation  4. Musculoskeletal: No pain with palpation to distal second metatarsal at location of previous subjective pain.  No pain with  range of motion of second MPJ.  Negative Lachman test.  Mild flexion contracture to digits 2-4 bilateral feet.  Overall no pain on exam to right foot today.       ASSESSMENT AND PLAN:   Diagnoses and all orders for this visit:    Right foot injury, initial encounter    Hammertoes of both feet        Plan:   -Patient was seen and evaluated today in clinic.  Chart history reviewed.    -I was able to review most recent right foot radiographs, with no concerns of osseous abnormalities    -Discussed the etiology of patient's previous pain today and treatment options for this.  Explained to patient that she may have injured her plantar plate of the right second MPJ.  Benign exam today.  Did explain to patient that she may experience hammertoe deformity if she did injure the plantar plate.  We will monitor this.    -Discussed the importance of added support to the feet.  Recommend that she continue utilizing supportive shoe gear at all times.  Can look into custom orthotics in the future if needed    -It is also recommended that patient rest, ice, and elevate in order to control inflammation with any increasing pain.  Also recommend patient follow-up if pain does return.    -The patient indicates understanding of these issues and agrees to the plan.  All of their questions and concerns were addressed.    Time spent reviewing pertinent information from patient's chart, reviewing any pertinent imaging, obtaining history and physical exam, discussing and mutually agreeing on a treatment plan, and documenting encounter: 25 minutes    RTC as needed      Willis Shepard DPM        8/7/2024    Dragon speech recognition software was used to prepare this note.  Errors in word recognition may occur.  Please contact me with any questions/concerns with this note.

## 2024-08-15 ENCOUNTER — TELEPHONE (OUTPATIENT)
Dept: PHYSICAL THERAPY | Facility: HOSPITAL | Age: 49
End: 2024-08-15

## 2024-08-20 ENCOUNTER — APPOINTMENT (OUTPATIENT)
Dept: PHYSICAL THERAPY | Age: 49
End: 2024-08-20
Attending: STUDENT IN AN ORGANIZED HEALTH CARE EDUCATION/TRAINING PROGRAM
Payer: COMMERCIAL

## 2024-08-22 DIAGNOSIS — E66.9 OBESITY WITH BODY MASS INDEX GREATER THAN 30: ICD-10-CM

## 2024-08-23 RX ORDER — PHENTERMINE HYDROCHLORIDE 30 MG/1
30 CAPSULE ORAL EVERY MORNING
Qty: 30 CAPSULE | Refills: 0 | Status: SHIPPED | OUTPATIENT
Start: 2024-08-23

## 2024-08-23 NOTE — TELEPHONE ENCOUNTER
Please review; protocol failed/No Protocol    Recent Fills: 05/09/2024, 06/07/2024, 07/22/2024    Last Rx Written: 07/22/2024    Last Office Visit: 06/06/2024    Requested Prescriptions   Pending Prescriptions Disp Refills    PHENTERMINE HCL 30 MG Oral Cap [Pharmacy Med Name: PHENTERMINE HCL 30MG CAPSULES] 30 capsule 0     Sig: TAKE 1 CAPSULE(30 MG) BY MOUTH EVERY MORNING       Controlled Substance Medication Failed - 8/22/2024  7:44 AM        Failed - This medication is a controlled substance - forward to provider to refill           Future Appointments         Provider Department Appt Notes    In 1 week WDR DEXA RM1; WDR GLEN RM1 [facility]     In 1 week Claudine Freed MD [facility] 3 mon f/u    In 3 weeks Machelle Jacobsen MD [facility] [place] [place] - OB/GYN discuss  symptoms/ possible HRT          Recent Outpatient Visits              2 weeks ago Right foot injury, initial encounter    [facility], [address] [place] [address], [place] Saran Shepard, DPM    Office Visit    1 month ago     [facility] Prasanth Bateman, PT    Office Visit    1 month ago Right foot pain    [facility], Mervat Cortez, DPM    Office Visit    1 month ago     [facility] Liliam Fine, PT    Office Visit    2 months ago Symptomatic menopausal or female climacteric states    [facility] [place] - OB/GYN Machelle Jacobsen MD    Office Visit

## 2024-08-31 ENCOUNTER — HOSPITAL ENCOUNTER (OUTPATIENT)
Dept: MAMMOGRAPHY | Age: 49
Discharge: HOME | End: 2024-08-31
Attending: OBSTETRICS & GYNECOLOGY
Payer: COMMERCIAL

## 2024-08-31 DIAGNOSIS — E66.9 OBESITY WITH BODY MASS INDEX GREATER THAN 30: ICD-10-CM

## 2024-08-31 DIAGNOSIS — Z12.31 ENCOUNTER FOR SCREENING MAMMOGRAM FOR BREAST CANCER: ICD-10-CM

## 2024-08-31 PROCEDURE — 77067 SCR MAMMO BI INCL CAD: CPT | Performed by: OBSTETRICS & GYNECOLOGY

## 2024-08-31 PROCEDURE — 77063 BREAST TOMOSYNTHESIS BI: CPT | Performed by: OBSTETRICS & GYNECOLOGY

## 2024-09-03 RX ORDER — PHENTERMINE HYDROCHLORIDE 30 MG/1
30 CAPSULE ORAL EVERY MORNING
Qty: 30 CAPSULE | Refills: 0 | OUTPATIENT
Start: 2024-09-03

## 2024-09-04 ENCOUNTER — OFFICE VISIT (OUTPATIENT)
Dept: FAMILY MEDICINE CLINIC | Facility: CLINIC | Age: 49
End: 2024-09-04
Payer: COMMERCIAL

## 2024-09-04 VITALS
HEIGHT: 61 IN | WEIGHT: 158.63 LBS | SYSTOLIC BLOOD PRESSURE: 95 MMHG | DIASTOLIC BLOOD PRESSURE: 62 MMHG | HEART RATE: 80 BPM | BODY MASS INDEX: 29.95 KG/M2

## 2024-09-04 DIAGNOSIS — M25.561 ACUTE PAIN OF RIGHT KNEE: ICD-10-CM

## 2024-09-04 DIAGNOSIS — E66.9 OBESITY WITH BODY MASS INDEX GREATER THAN 30: ICD-10-CM

## 2024-09-04 DIAGNOSIS — Z00.00 WELL ADULT EXAM: Primary | ICD-10-CM

## 2024-09-04 PROCEDURE — 99212 OFFICE O/P EST SF 10 MIN: CPT | Performed by: STUDENT IN AN ORGANIZED HEALTH CARE EDUCATION/TRAINING PROGRAM

## 2024-09-04 PROCEDURE — 99396 PREV VISIT EST AGE 40-64: CPT | Performed by: STUDENT IN AN ORGANIZED HEALTH CARE EDUCATION/TRAINING PROGRAM

## 2024-09-04 PROCEDURE — 3008F BODY MASS INDEX DOCD: CPT | Performed by: STUDENT IN AN ORGANIZED HEALTH CARE EDUCATION/TRAINING PROGRAM

## 2024-09-04 PROCEDURE — 3074F SYST BP LT 130 MM HG: CPT | Performed by: STUDENT IN AN ORGANIZED HEALTH CARE EDUCATION/TRAINING PROGRAM

## 2024-09-04 PROCEDURE — 96372 THER/PROPH/DIAG INJ SC/IM: CPT | Performed by: STUDENT IN AN ORGANIZED HEALTH CARE EDUCATION/TRAINING PROGRAM

## 2024-09-04 PROCEDURE — 3078F DIAST BP <80 MM HG: CPT | Performed by: STUDENT IN AN ORGANIZED HEALTH CARE EDUCATION/TRAINING PROGRAM

## 2024-09-04 RX ORDER — PHENTERMINE HYDROCHLORIDE 15 MG/1
15 CAPSULE ORAL EVERY MORNING
Qty: 30 CAPSULE | Refills: 0 | Status: SHIPPED | OUTPATIENT
Start: 2024-09-04

## 2024-09-04 RX ORDER — ESTRADIOL 0.05 MG/D
1 PATCH TRANSDERMAL WEEKLY
Qty: 4 PATCH | Refills: 0 | Status: SHIPPED | OUTPATIENT
Start: 2024-09-04

## 2024-09-04 RX ORDER — CYANOCOBALAMIN 1000 UG/ML
1000 INJECTION, SOLUTION INTRAMUSCULAR; SUBCUTANEOUS ONCE
Status: COMPLETED | OUTPATIENT
Start: 2024-09-04 | End: 2024-09-04

## 2024-09-04 RX ADMIN — CYANOCOBALAMIN 1000 MCG: 1000 INJECTION, SOLUTION INTRAMUSCULAR; SUBCUTANEOUS at 16:24:00

## 2024-09-04 NOTE — TELEPHONE ENCOUNTER
Requested Prescriptions     Pending Prescriptions Disp Refills    estradiol 0.05 MG/24HR Transdermal Patch Weekly 24 patch 0     Sig: Place 1 patch onto the skin once a week.     Last annual 2/16/24  Last filled 6/19/24 x 24 patch  Pap UTD  Mammo UTD    Per patient, lost 2 patches that didn't stick, so short. Needs refill to last until 9/18 appointment. Refill sent.

## 2024-09-04 NOTE — PROGRESS NOTES
HPI:    Patient ID: Yas Newman is a 49 year old female.    HPI  Pt presenting for routine physical exam. Denies any acute issues or recent illnesses. No significant chronic medical problems. Past medical/surgical history, family history, and social history were reviewed.     H/o obesity BMI 30.47 Aug 2023 (156lbs)  H/o postmenopausal  9/2023: 153lb, BMI 29.88  started on phentermine 15mg  10/2023: 152lbs, BMI 29.69  11/2023: increased to 30mg  1/2024: 146lbs (BMI 27.61)  3/2024: 155lbs due to dosing lapse in the last month, vacation  Didn't tolerate Topirmate due to side effects (headache)  5/2024: 155lbs -- reports clean, consistent eating  Trulicity sent -- denied, will need to try/fail Metformin  Has not started Metformin due to concern for side effects during work  Admits to some weight loss but regained with lack of activity related to above  9/2024: started HRT per Gyne -- menopausal symptoms improved, better sleep  Focusing on strength training  Clothing is fitting better    Reports Right knee pain flare  Prior cheerleading injury    Mood stable, denies SH/SI/HI      Review of Systems   A comprehensive 10 point review of systems was completed.  Pertinent positives and negatives noted in the the HPI.       Current Outpatient Medications   Medication Sig Dispense Refill    estradiol 0.05 MG/24HR Transdermal Patch Weekly Place 1 patch onto the skin once a week. 4 patch 0    Phentermine HCl 15 MG Oral Cap Take 1 capsule (15 mg total) by mouth every morning. 30 capsule 0    Phentermine HCl 30 MG Oral Cap Take 1 capsule (30 mg total) by mouth every morning. 30 capsule 0    progesterone (PROMETRIUM) 100 MG Oral Cap Take 1 capsule (100 mg total) by mouth nightly. 90 capsule 1    sertraline 25 MG Oral Tab Take 1 tablet (25 mg total) by mouth daily. 90 tablet 1    magnesium 250 MG Oral Tab Take 1 tablet (250 mg total) by mouth.      multivitamin Oral Chew Tab Chew 1 tablet by mouth daily.      Dulaglutide  0.75 MG/0.5ML Subcutaneous Solution Pen-injector Inject 0.75 mg into the skin once a week. (Patient not taking: Reported on 9/4/2024) 4 each 3     Allergies:  Allergies   Allergen Reactions    Seasonal ITCHING and OTHER (SEE COMMENTS)     Watery eyes, sneezing      Vitals:    09/04/24 1552   BP: 95/62   Pulse: 80   Weight: 158 lb 9.6 oz (71.9 kg)   Height: 5' 1\" (1.549 m)       Body mass index is 29.97 kg/m².   PHYSICAL EXAM:   Physical Exam  Vitals reviewed.   Constitutional:       General: She is not in acute distress.     Appearance: Normal appearance. She is well-developed.   HENT:      Head: Normocephalic and atraumatic.      Right Ear: Tympanic membrane, ear canal and external ear normal.      Left Ear: Tympanic membrane, ear canal and external ear normal.   Eyes:      Conjunctiva/sclera: Conjunctivae normal.   Neck:      Thyroid: No thyroid mass or thyroid tenderness.   Cardiovascular:      Rate and Rhythm: Normal rate and regular rhythm.      Pulses: Normal pulses.      Heart sounds: Normal heart sounds, S1 normal and S2 normal. No murmur heard.  Pulmonary:      Effort: Pulmonary effort is normal. No respiratory distress.      Breath sounds: Normal breath sounds. No wheezing, rhonchi or rales.   Abdominal:      General: Bowel sounds are normal.      Palpations: Abdomen is soft.      Tenderness: There is no abdominal tenderness. There is no guarding or rebound.   Musculoskeletal:      Cervical back: Normal range of motion and neck supple. No muscular tenderness.      Right lower leg: No edema.      Left lower leg: No edema.   Lymphadenopathy:      Cervical: No cervical adenopathy.   Skin:     General: Skin is warm and dry.      Coloration: Skin is not jaundiced.   Neurological:      General: No focal deficit present.      Mental Status: She is alert and oriented to person, place, and time. Mental status is at baseline.   Psychiatric:         Attention and Perception: Attention normal.         Mood and Affect:  Mood normal.         Behavior: Behavior normal. Behavior is cooperative.         Cognition and Memory: Cognition normal.             ASSESSMENT/PLAN:   1. Well adult exam  Discussed preventative screenings  - Pap 6/2022  - mammogram 8/2024  - Cscope 1/2022, 5yr recall  - will check labs as below  - encouraged to continue diet/exercise for overall wellness  - follow-up with eye doctor annually  - follow-up with dentist every 6 months  - return yearly for physicals  - annual flu shot  - tetanus booster every 10yrs  - TSH W Reflex To Free T4; Future  - CBC, Platelet; No Differential; Future  - Comp Metabolic Panel (14); Future  - Hemoglobin A1C; Future  - Lipid Panel; Future  - Vitamin D; Future    2. Obesity with body mass index greater than 30  H/o obesity BMI 30.47 Aug 2023 (156lbs)  H/o postmenopausal  9/2023: 153lb, BMI 29.88  started on phentermine 15mg  10/2023: 152lbs, BMI 29.69  11/2023: increased to 30mg  1/2024: 146lbs (BMI 27.61)  3/2024: 155lbs due to dosing lapse in the last month, vacation  Didn't tolerate Topirmate due to side effects (headache)  5/2024: 155lbs -- reports clean, consistent eating  Trulicity sent -- denied, will need to try/fail Metformin  Has not started Metformin due to concern for side effects during work  Admits to some weight loss but regained with lack of activity related to above  9/2024: started HRT per Gyne -- menopausal symptoms improved, better sleep  - will trial B12 dosing  - discussed healthy diet and lifestyle changes for overall wellness, which includes decreased carb and sugar intake, increased fiber intake, and increased water intake as tolerated, as well as regular exercise  - counseled on increased protein intake, goal 20-30g/meal  - goal moderate-intensity activity 30min daily / 150min per week  - continue Phentermine dosing  - cyanocobalamin (Vitamin B12) 1000 MCG/ML injection 1,000 mcg  - Phentermine HCl 15 MG Oral Cap; Take 1 capsule (15 mg total) by mouth every  morning.  Dispense: 30 capsule; Refill: 0    3. Acute pain of right knee  - provided with gentle stretching/strengthening exercises  - encouraged to increase hydration and rest as able  - Tylenol/NSAIDs as needed  - consider PT for refractory symptoms  - to call with any questions or concerns    Pt verbalized understanding and agrees with plan.    Orders Placed This Encounter   Procedures    TSH W Reflex To Free T4    CBC, Platelet; No Differential    Comp Metabolic Panel (14)    Hemoglobin A1C    Lipid Panel    Vitamin D       Meds This Visit:  Requested Prescriptions     Signed Prescriptions Disp Refills    Phentermine HCl 15 MG Oral Cap 30 capsule 0     Sig: Take 1 capsule (15 mg total) by mouth every morning.       Imaging & Referrals:  None         ID#[id number]

## 2024-09-14 ENCOUNTER — LAB ENCOUNTER (OUTPATIENT)
Dept: LAB | Age: 49
End: 2024-09-14
Attending: STUDENT IN AN ORGANIZED HEALTH CARE EDUCATION/TRAINING PROGRAM
Payer: COMMERCIAL

## 2024-09-14 DIAGNOSIS — Z00.00 WELL ADULT EXAM: ICD-10-CM

## 2024-09-14 LAB
ALBUMIN SERPL-MCNC: 4.6 G/DL (ref 3.2–4.8)
ALBUMIN/GLOB SERPL: 1.7 {RATIO} (ref 1–2)
ALP LIVER SERPL-CCNC: 53 U/L
ALT SERPL-CCNC: 17 U/L
ANION GAP SERPL CALC-SCNC: 7 MMOL/L (ref 0–18)
AST SERPL-CCNC: 22 U/L (ref ?–34)
BILIRUB SERPL-MCNC: 0.5 MG/DL (ref 0.3–1.2)
BUN BLD-MCNC: 16 MG/DL (ref 9–23)
BUN/CREAT SERPL: 20 (ref 10–20)
CALCIUM BLD-MCNC: 9.7 MG/DL (ref 8.7–10.4)
CHLORIDE SERPL-SCNC: 108 MMOL/L (ref 98–112)
CHOLEST SERPL-MCNC: 181 MG/DL (ref ?–200)
CO2 SERPL-SCNC: 26 MMOL/L (ref 21–32)
CREAT BLD-MCNC: 0.8 MG/DL
DEPRECATED RDW RBC AUTO: 41.5 FL (ref 35.1–46.3)
EGFRCR SERPLBLD CKD-EPI 2021: 90 ML/MIN/1.73M2 (ref 60–?)
ERYTHROCYTE [DISTWIDTH] IN BLOOD BY AUTOMATED COUNT: 13 % (ref 11–15)
EST. AVERAGE GLUCOSE BLD GHB EST-MCNC: 105 MG/DL (ref 68–126)
FASTING PATIENT LIPID ANSWER: YES
FASTING STATUS PATIENT QL REPORTED: YES
GLOBULIN PLAS-MCNC: 2.7 G/DL (ref 2–3.5)
GLUCOSE BLD-MCNC: 73 MG/DL (ref 70–99)
HBA1C MFR BLD: 5.3 % (ref ?–5.7)
HCT VFR BLD AUTO: 38.4 %
HDLC SERPL-MCNC: 70 MG/DL (ref 40–59)
HGB BLD-MCNC: 12.9 G/DL
LDLC SERPL CALC-MCNC: 102 MG/DL (ref ?–100)
MCH RBC QN AUTO: 29.4 PG (ref 26–34)
MCHC RBC AUTO-ENTMCNC: 33.6 G/DL (ref 31–37)
MCV RBC AUTO: 87.5 FL
NONHDLC SERPL-MCNC: 111 MG/DL (ref ?–130)
OSMOLALITY SERPL CALC.SUM OF ELEC: 292 MOSM/KG (ref 275–295)
PLATELET # BLD AUTO: 306 10(3)UL (ref 150–450)
POTASSIUM SERPL-SCNC: 4.4 MMOL/L (ref 3.5–5.1)
PROT SERPL-MCNC: 7.3 G/DL (ref 5.7–8.2)
RBC # BLD AUTO: 4.39 X10(6)UL
SODIUM SERPL-SCNC: 141 MMOL/L (ref 136–145)
TRIGL SERPL-MCNC: 45 MG/DL (ref 30–149)
TSI SER-ACNC: 2.14 MIU/ML (ref 0.55–4.78)
VIT D+METAB SERPL-MCNC: 72.9 NG/ML (ref 30–100)
VLDLC SERPL CALC-MCNC: 7 MG/DL (ref 0–30)
WBC # BLD AUTO: 5.9 X10(3) UL (ref 4–11)

## 2024-09-14 PROCEDURE — 80053 COMPREHEN METABOLIC PANEL: CPT

## 2024-09-14 PROCEDURE — 80061 LIPID PANEL: CPT

## 2024-09-14 PROCEDURE — 82306 VITAMIN D 25 HYDROXY: CPT

## 2024-09-14 PROCEDURE — 85027 COMPLETE CBC AUTOMATED: CPT

## 2024-09-14 PROCEDURE — 83036 HEMOGLOBIN GLYCOSYLATED A1C: CPT

## 2024-09-14 PROCEDURE — 84443 ASSAY THYROID STIM HORMONE: CPT

## 2024-09-14 PROCEDURE — 36415 COLL VENOUS BLD VENIPUNCTURE: CPT

## 2024-09-18 ENCOUNTER — OFFICE VISIT (OUTPATIENT)
Dept: OBGYN CLINIC | Facility: CLINIC | Age: 49
End: 2024-09-18

## 2024-09-18 ENCOUNTER — PATIENT MESSAGE (OUTPATIENT)
Dept: FAMILY MEDICINE CLINIC | Facility: CLINIC | Age: 49
End: 2024-09-18

## 2024-09-18 VITALS
SYSTOLIC BLOOD PRESSURE: 105 MMHG | WEIGHT: 155.81 LBS | HEART RATE: 62 BPM | BODY MASS INDEX: 29 KG/M2 | DIASTOLIC BLOOD PRESSURE: 70 MMHG

## 2024-09-18 DIAGNOSIS — Z79.890 HORMONE REPLACEMENT THERAPY (HRT): Primary | ICD-10-CM

## 2024-09-18 DIAGNOSIS — E66.9 OBESITY WITH BODY MASS INDEX GREATER THAN 30: Primary | ICD-10-CM

## 2024-09-18 PROCEDURE — 3078F DIAST BP <80 MM HG: CPT | Performed by: OBSTETRICS & GYNECOLOGY

## 2024-09-18 PROCEDURE — 99212 OFFICE O/P EST SF 10 MIN: CPT | Performed by: OBSTETRICS & GYNECOLOGY

## 2024-09-18 PROCEDURE — 3074F SYST BP LT 130 MM HG: CPT | Performed by: OBSTETRICS & GYNECOLOGY

## 2024-09-18 RX ORDER — ESTRADIOL 0.05 MG/D
1 PATCH TRANSDERMAL WEEKLY
Qty: 24 PATCH | Refills: 1 | Status: SHIPPED | OUTPATIENT
Start: 2024-09-18

## 2024-09-18 RX ORDER — PROGESTERONE 100 MG/1
100 CAPSULE ORAL NIGHTLY
Qty: 90 CAPSULE | Refills: 1 | Status: SHIPPED | OUTPATIENT
Start: 2024-09-18 | End: 2025-09-18

## 2024-09-18 NOTE — PROGRESS NOTES
Yas Newman    1975       Chief Complaint   Patient presents with    Follow - Up     Follow up FROM HRT, DISCUSS  SYMPTOMS     Patient noted improvement in symptoms in 1 week after starting her patch.  Breast tenderness resolved and she has had minimal spotting which has also resolved.  Sleep has improved but did slightly increase when she started school again.  Magnesium at night helps her.  She has been doing strength training since .  She listed to the menopause book that I recommended.      Past Medical History:    Allergic rhinitis    Anxiety    Fibrocystic breast    Seasonal allergies       Past Surgical History:   Procedure Laterality Date      2015       Menstrual History:  OB History    Para Term  AB Living   1 1 1 0 0 1   SAB IAB Ectopic Multiple Live Births   0 0 0 0 1        No LMP recorded. (Menstrual status: Menopause).        Hx Prior Abnormal Pap: No  Pap Date: 06/15/22  Pap Result Notes: NEG PAP / NEG HPV  Follow Up Recommendation: MAMMO 24 NEG     PHYSICAL EXAM:       Constitutional: well developed, well nourished    Psychiatric:  Oriented to time, place, person and situation. Appropriate mood and affect        Yas was seen today for follow - up.    Diagnoses and all orders for this visit:    Hormone replacement therapy (HRT)    Other orders  -     progesterone (PROMETRIUM) 100 MG Oral Cap; Take 1 capsule (100 mg total) by mouth nightly.  -     estradiol 0.05 MG/24HR Transdermal Patch Weekly; Place 1 patch onto the skin once a week.          Rtc in February for annual exam

## 2024-09-20 RX ORDER — CYANOCOBALAMIN 1000 UG/ML
1000 INJECTION, SOLUTION INTRAMUSCULAR; SUBCUTANEOUS
Qty: 10 ML | Refills: 0 | Status: SHIPPED | OUTPATIENT
Start: 2024-09-20

## 2024-10-10 ENCOUNTER — PATIENT MESSAGE (OUTPATIENT)
Dept: FAMILY MEDICINE CLINIC | Facility: CLINIC | Age: 49
End: 2024-10-10

## 2024-10-10 DIAGNOSIS — E66.9 OBESITY WITH BODY MASS INDEX GREATER THAN 30: ICD-10-CM

## 2024-10-10 RX ORDER — PHENTERMINE HYDROCHLORIDE 30 MG/1
30 CAPSULE ORAL EVERY MORNING
Qty: 30 CAPSULE | Refills: 0 | Status: SHIPPED | OUTPATIENT
Start: 2024-11-06

## 2024-10-10 RX ORDER — PHENTERMINE HYDROCHLORIDE 30 MG/1
30 CAPSULE ORAL EVERY MORNING
Qty: 30 CAPSULE | Refills: 0 | Status: SHIPPED | OUTPATIENT
Start: 2024-10-10

## 2024-10-10 NOTE — TELEPHONE ENCOUNTER
Dr. Freed - see message from patient. Pended 30 mg phentermine, if appropriate.   Please respond directly to the patient if no additional staff support is required.

## 2024-11-20 DIAGNOSIS — E66.9 OBESITY WITH BODY MASS INDEX GREATER THAN 30: ICD-10-CM

## 2024-11-26 RX ORDER — PHENTERMINE HYDROCHLORIDE 30 MG/1
30 CAPSULE ORAL EVERY MORNING
Qty: 30 CAPSULE | Refills: 0 | OUTPATIENT
Start: 2024-11-26

## 2024-11-26 RX ORDER — PHENTERMINE HYDROCHLORIDE 30 MG/1
30 CAPSULE ORAL EVERY MORNING
Qty: 30 CAPSULE | Refills: 0 | Status: SHIPPED | OUTPATIENT
Start: 2024-12-23

## 2024-11-26 RX ORDER — PHENTERMINE HYDROCHLORIDE 30 MG/1
30 CAPSULE ORAL EVERY MORNING
Qty: 30 CAPSULE | Refills: 0 | Status: SHIPPED | OUTPATIENT
Start: 2024-11-26

## 2024-11-26 NOTE — TELEPHONE ENCOUNTER
Please review.  Protocol failed / Has no protocol.     Phentermine Recent fills each # 30 : 7/22 30mg, 8/23 30mg, 9/23 15mg, 10/21 30mg  Last prescription written: 10/10/24  Last office visit: 9/4/24    Requested Prescriptions   Pending Prescriptions Disp Refills    PHENTERMINE HCL 30 MG Oral Cap [Pharmacy Med Name: PHENTERMINE HCL 30MG CAPSULES] 30 capsule 0     Sig: TAKE 1 CAPSULE(30 MG) BY MOUTH EVERY MORNING       Controlled Substance Medication Failed - 11/26/2024 12:16 PM        Failed - This medication is a controlled substance - forward to provider to refill           Future Appointments         Provider Department Appt Notes    In 2 months Machelle Jacobsen MD [facility] [place] com[place] with pap          Recent Outpatient Visits              2 months ago Hormone replacement therapy (HRT)    [facility], [place] [place] - OB/GYN Machelle Jacobsen MD    Office Visit    2 months ago Well adult exam    [facility], [facility] [place]    Office Visit    3 months ago Right foot injury, initial encounter    [facility], [address] [place] [address] Saran Shepard DPM    Office Visit    4 months ago     [facility] Prasanth Bateman PT    Office Visit    4 months ago Right foot pain    [facility], [facility], [place] Mervat Ma DPM    Office Visit

## 2024-12-24 DIAGNOSIS — E66.9 OBESITY WITH BODY MASS INDEX GREATER THAN 30: ICD-10-CM

## 2024-12-26 ENCOUNTER — NURSE TRIAGE (OUTPATIENT)
Dept: FAMILY MEDICINE CLINIC | Facility: CLINIC | Age: 49
End: 2024-12-26

## 2024-12-26 NOTE — TELEPHONE ENCOUNTER
Action Requested: Summary for Provider     []  Critical Lab, Recommendations Needed  [] Need Additional Advice  []   FYI    []   Need Orders  [] Need Medications Sent to Pharmacy  []  Other     SUMMARY: brace  Patient calling, has had ongoing chronic knee pain - old injury in high school and of and off pain since.   Patient says that recently was moving furniture around at home and since has been bothering her consistently. Up and down stairs hurts. Mostly was in back of knee but now front of knee cap and behind that she can apply pressure and it feels bruised but no visible bruising.   Patient was wearing brace but not consistent.   Had visit today, missed visit, thought it was tomorrow. Scheduled with another provider tomorrow for eval already, was checking if something sooner.   Future Appointments   Date Time Provider Department Center   12/27/2024  3:15 PM Nguyen, Minhxuyen, PA-C [facility] [place]   2/19/2025  8:00 AM Machelle Jacobsen MD [facility] [place]         Reason for call: Knee Pain  Onset: months      Reason for Disposition   Knee pain is a chronic symptom (recurrent or ongoing AND lasting > 4 weeks)    Protocols used: Knee Pain-A-OH

## 2024-12-27 ENCOUNTER — OFFICE VISIT (OUTPATIENT)
Dept: FAMILY MEDICINE CLINIC | Facility: CLINIC | Age: 49
End: 2024-12-27
Payer: COMMERCIAL

## 2024-12-27 ENCOUNTER — HOSPITAL ENCOUNTER (OUTPATIENT)
Dept: GENERAL RADIOLOGY | Age: 49
Discharge: HOME | End: 2024-12-27
Attending: PHYSICIAN ASSISTANT
Payer: COMMERCIAL

## 2024-12-27 VITALS
WEIGHT: 165 LBS | HEIGHT: 61 IN | BODY MASS INDEX: 31.15 KG/M2 | DIASTOLIC BLOOD PRESSURE: 83 MMHG | SYSTOLIC BLOOD PRESSURE: 114 MMHG | HEART RATE: 101 BPM

## 2024-12-27 DIAGNOSIS — M25.561 ACUTE PAIN OF RIGHT KNEE: Primary | ICD-10-CM

## 2024-12-27 DIAGNOSIS — M25.561 ACUTE PAIN OF RIGHT KNEE: ICD-10-CM

## 2024-12-27 DIAGNOSIS — E66.9 OBESITY WITH BODY MASS INDEX GREATER THAN 30: ICD-10-CM

## 2024-12-27 PROCEDURE — 99213 OFFICE O/P EST LOW 20 MIN: CPT | Performed by: PHYSICIAN ASSISTANT

## 2024-12-27 PROCEDURE — 3079F DIAST BP 80-89 MM HG: CPT | Performed by: PHYSICIAN ASSISTANT

## 2024-12-27 PROCEDURE — 3074F SYST BP LT 130 MM HG: CPT | Performed by: PHYSICIAN ASSISTANT

## 2024-12-27 PROCEDURE — 3008F BODY MASS INDEX DOCD: CPT | Performed by: PHYSICIAN ASSISTANT

## 2024-12-27 PROCEDURE — 73562 X-RAY EXAM OF KNEE 3: CPT | Performed by: PHYSICIAN ASSISTANT

## 2024-12-27 NOTE — PROGRESS NOTES
HPI:     HPI  A 49-year-old woman presents with right knee pain that has lasted for the past month. The pain is localized and worsens when she goes up and down stairs. The patient does not take any pain relief medication. She denies experiencing weakness, numbness, tingling sensations, bruising, or swelling.      Medications:     Current Outpatient Medications   Medication Sig Dispense Refill    sertraline 25 MG Oral Tab Take 1 tablet (25 mg total) by mouth daily. 90 tablet 0    Phentermine HCl 30 MG Oral Cap Take 1 capsule (30 mg total) by mouth every morning. 30 capsule 0    cyanocobalamin 1000 MCG/ML Injection Solution Inject 1 mL (1,000 mcg total) into the muscle every 30 (thirty) days. 10 mL 0    estradiol 0.05 MG/24HR Transdermal Patch Weekly Place 1 patch onto the skin once a week. 24 patch 1    magnesium 250 MG Oral Tab Take 1 tablet (250 mg total) by mouth.      multivitamin Oral Chew Tab Chew 1 tablet by mouth daily.      Insulin Syringe-Needle U-100 31G X 15/64\" 1 ML Does not apply Misc 1 each every 30 (thirty) days. (Patient not taking: Reported on 12/27/2024) 10 each 0    progesterone (PROMETRIUM) 100 MG Oral Cap Take 1 capsule (100 mg total) by mouth nightly. (Patient not taking: Reported on 12/27/2024) 90 capsule 1       Allergies:   Allergies[1]    History:     Health Maintenance   Topic Date Due    COVID-19 Vaccine (4 - 2024-25 season) 09/01/2024    Pap Smear  06/15/2025    DTaP,Tdap,and Td Vaccines (2 - Td or Tdap) 06/10/2025    Zoster Vaccines (1 of 2) 06/19/2025    Mammogram  08/31/2025    Annual Physical  09/04/2025    Colorectal Cancer Screening  01/24/2027    Influenza Vaccine  Completed    Annual Depression Screening  Completed    Pneumococcal Vaccine: Birth to 64yrs  Aged Out       No LMP recorded. (Menstrual status: Menopause).   Past Medical History:     Past Medical History:    Allergic rhinitis    Anxiety    Fibrocystic breast    Seasonal allergies       Past Surgical History:     Past  Surgical History:   Procedure Laterality Date      2015       Family History:     Family History   Problem Relation Age of Onset    Heart Disorder Mother 72    Other (cad) Mother     Other (etoh abuse) Father     Heart Attack Paternal Grandfather 50    Cancer Neg         breast, ovarian, uterine, cervical, colon       Social History:     Social History     Socioeconomic History    Marital status: Single     Spouse name: Not on file    Number of children: Not on file    Years of education: Not on file    Highest education level: Not on file   Occupational History    Not on file   Tobacco Use    Smoking status: Former     Current packs/day: 0.00     Average packs/day: 0.5 packs/day for 3.0 years (1.5 ttl pk-yrs)     Types: Cigarettes     Start date: 1995     Quit date: 1998     Years since quittin.3    Smokeless tobacco: Never   Vaping Use    Vaping status: Never Used   Substance and Sexual Activity    Alcohol use: Not Currently     Comment: 3 years & 3 momths months alcohol free    Drug use: Never    Sexual activity: Not Currently   Other Topics Concern    Grew up on a farm Not Asked    History of tanning Yes    Outdoor occupation Not Asked    Breast feeding Not Asked    Reaction to local anesthetic No    Pt has a pacemaker No    Pt has a defibrillator No   Social History Narrative    Not on file     Social Drivers of Health     Financial Resource Strain: Not on file   Food Insecurity: Not on file   Transportation Needs: Not on file   Physical Activity: Not on file   Stress: Not on file   Social Connections: Not on file   Housing Stability: Not on file       Review of Systems:   Review of Systems   + right knee pain    Vitals:    24 1456   BP: 114/83   Pulse: 101   Weight: 165 lb (74.8 kg)   Height: 5' 1\" (1.549 m)     Body mass index is 31.18 kg/m².    Physical Exam:   Physical Exam  Vitals reviewed.      Bilateral knee: No bony deformities, inflammation, or tenderness in bony  prominences or soft tissue. No Baker's cyst. Full ROM (extension and flexion). No effusion sign.      Assessment and Plan::     Problem List Items Addressed This Visit    None  Visit Diagnoses       Acute pain of right knee    -  Primary    Relevant Orders    XR KNEE (3 VIEWS), RIGHT (CPT=73562)    Physical Therapy Referral - [facility]        Advise patient to take Tylenol or Ibuprofen as needed for pain.    Discussed plan of care with pt and pt is in agreement.All questions answered. Pt to call with questions or concerns.         [1]   Allergies  Allergen Reactions    Seasonal ITCHING and OTHER (SEE COMMENTS)     Watery eyes, sneezing

## 2024-12-30 RX ORDER — CYANOCOBALAMIN 1000 UG/ML
1000 INJECTION, SOLUTION INTRAMUSCULAR; SUBCUTANEOUS
Qty: 10 ML | Refills: 0 | OUTPATIENT
Start: 2024-12-30

## 2025-01-02 ENCOUNTER — TELEPHONE (OUTPATIENT)
Dept: FAMILY MEDICINE CLINIC | Facility: CLINIC | Age: 50
End: 2025-01-02

## 2025-01-02 RX ORDER — PHENTERMINE HYDROCHLORIDE 30 MG/1
30 CAPSULE ORAL EVERY MORNING
Qty: 30 CAPSULE | Refills: 0 | OUTPATIENT
Start: 2025-01-02

## 2025-01-02 NOTE — TELEPHONE ENCOUNTER
Patient is calling to request that her Physical Therapy referral placed to [place] [facility] be faxed the [facility] location with fax number [phone]. Faxed to [employer] today.    Patient is a  and cannot make any office visit appointments until 4:45 or 5 pm due to her job and she's a single mother so her time is limited. Per chart patient message encounter 12/31/24, Dr. Freed would like her to schedule an in person or virtual appointment for which I reviewed her schedule and tried to schedule a virtual but patient advised she's on [place] until late tomorrow.     Patient is requesting if she can possibly be added onto schedule on Monday January 6th after 3:15.    Dr. Freed, please advise     RN's call patient at [phone] (ok to leave detailed message)

## 2025-01-09 ENCOUNTER — TELEMEDICINE (OUTPATIENT)
Dept: FAMILY MEDICINE CLINIC | Facility: CLINIC | Age: 50
End: 2025-01-09
Payer: COMMERCIAL

## 2025-01-09 DIAGNOSIS — E66.9 OBESITY WITH BODY MASS INDEX GREATER THAN 30: Primary | ICD-10-CM

## 2025-01-09 DIAGNOSIS — M25.561 ACUTE PAIN OF RIGHT KNEE: ICD-10-CM

## 2025-01-09 PROCEDURE — 98006 SYNCH AUDIO-VIDEO EST MOD 30: CPT | Performed by: STUDENT IN AN ORGANIZED HEALTH CARE EDUCATION/TRAINING PROGRAM

## 2025-01-09 NOTE — PROGRESS NOTES
Virtual Telephone Check-In    Yas Newman verbally consents to a Virtual/Telephone Check-In visit on 01/09/25.  Patient has been referred to the [facility] website at [email or web address] to review the yearly Consent to Treat document.    Patient understands and accepts financial responsibility for any deductible, co-insurance and/or co-pays associated with this service.    Duration of the service: 30 minutes      Summary of topics discussed:   Weight loss    Claudine Freed MD    [facility] of [facility]  Telehealth Verbal Consent   I conducted a telehealth visit with Yas Newman today, 01/09/25, which was completed using two-way, real-time interactive audio and video communication. This has been done in good vincent to provide continuity of care in the best interest of the provider-patient relationship, due to the COVID -19 public health crisis/national emergency where restrictions of face-to-face office visits are ongoing. Every conscious effort was taken to allow for sufficient and adequate time to complete the visit.  The patient was made aware of the limitations of the telehealth visit, including treatment limitations as no physical exam could be performed.  The patient was advised to call 911 or to go to the ER in case there was an emergency.  The patient was also advised of the potential privacy & security concerns related to the telehealth platform.   The patient was made aware of where to find [facility]'s notice of privacy practices, telehealth consent form and other related consent forms and documents.  which are located on the [facility] website. The patient verbally agreed to telehealth consent form, related consents and the risks discussed.    Lastly, the patient confirmed that they were in [place].   Included in this visit, time may have been spent reviewing labs, medications, radiology tests and decision making. Appropriate medical decision-making and tests are ordered as detailed in  the plan of care above.  Coding/billing information is submitted for this visit based on complexity of care and/or time spent for the visit.    HPI:    Patient ID: Yas Newman is a 49 year old female.    HPI  Pt presenting via video visit to discuss weight loss options.    H/o obesity BMI 30.47 Aug 2023 (156lbs) --> 165lbs Jan 2024 (BMI 31.19)  H/o postmenopausal  9/2023: 153lb, BMI 29.88  started on phentermine 15mg  10/2023: 152lbs, BMI 29.69  11/2023: increased to 30mg  1/2024: 146lbs (BMI 27.61)  3/2024: 155lbs due to dosing lapse in the last month, vacation  Didn't tolerate Topirmate due to side effects (headache)  5/2024: 155lbs -- reports clean, consistent eating  Trulicity sent -- denied, will need to try/fail Metformin  Has not started Metformin due to concern for side effects during work  Admits to some weight loss but regained with lack of activity related to above  9/2024: started HRT per Gyne -- menopausal symptoms improved, better sleep  Focusing on strength training  Clothing is fitting better  1/2025: 165lbs; reports increased work stress, altered work-life balance 9/2024 - 12/2024  Admits to increased emotional eating, inconsistent exercise  Reports increased knee pain due to 13lb weight gain  Describes anterior knee pain with stairs  Recent Right knee XR reviewed  Signed up for PT next week        Review of Systems   A comprehensive 10 point review of systems was completed.  Pertinent positives and negatives noted in the the HPI.       Current Outpatient Medications   Medication Sig Dispense Refill    semaglutide-weight management 0.25 MG/0.5ML Subcutaneous Solution Auto-injector Inject 0.5 mL (0.25 mg total) into the skin once a week for 4 doses. 2 mL 0    sertraline 25 MG Oral Tab Take 1 tablet (25 mg total) by mouth daily. 90 tablet 0    Phentermine HCl 30 MG Oral Cap Take 1 capsule (30 mg total) by mouth every morning. 30 capsule 0    cyanocobalamin 1000 MCG/ML Injection Solution  Inject 1 mL (1,000 mcg total) into the muscle every 30 (thirty) days. 10 mL 0    Insulin Syringe-Needle U-100 31G X 15/64\" 1 ML Does not apply Misc 1 each every 30 (thirty) days. (Patient not taking: Reported on 12/27/2024) 10 each 0    progesterone (PROMETRIUM) 100 MG Oral Cap Take 1 capsule (100 mg total) by mouth nightly. (Patient not taking: Reported on 12/27/2024) 90 capsule 1    estradiol 0.05 MG/24HR Transdermal Patch Weekly Place 1 patch onto the skin once a week. 24 patch 1    magnesium 250 MG Oral Tab Take 1 tablet (250 mg total) by mouth.      multivitamin Oral Chew Tab Chew 1 tablet by mouth daily.       Allergies:Allergies[1]   There were no vitals filed for this visit.    There is no height or weight on file to calculate BMI.   PHYSICAL EXAM:   Physical Exam   Vitals signs taken at home if available:     Limited examination for this telephone visit due to the coronavirus emergency     Patient was speaking in complete sentences, no increased work of breathing and very coherent and alert on the phone.  Alert and oriented x 3  Patient was responding to questions appropriately.  Patient did not appear short of breath.         ASSESSMENT/PLAN:   1. Obesity with body mass index greater than 30  H/o obesity BMI 30.47 Aug 2023 (156lbs) --> 165lbs Jan 2024 (BMI 31.19)  H/o postmenopausal  9/2023: 153lb, BMI 29.88  started on phentermine 15mg  10/2023: 152lbs, BMI 29.69  11/2023: increased to 30mg  1/2024: 146lbs (BMI 27.61)  3/2024: 155lbs due to dosing lapse in the last month, vacation  Didn't tolerate Topirmate due to side effects (headache)  5/2024: 155lbs -- reports clean, consistent eating  Trulicity sent -- denied, will need to try/fail Metformin  Has not started Metformin due to concern for side effects during work  Admits to some weight loss but regained with lack of activity related to above  9/2024: started HRT per Gyne -- menopausal symptoms improved, better sleep  Focusing on strength  training  Clothing is fitting better  1/2025: 165lbs; reports increased work stress, altered work-life balance 9/2024 - 12/2024  Admits to increased emotional eating, inconsistent exercise  Reports increased knee pain due to 13lb weight gain  - discussed healthy diet and lifestyle changes for overall wellness, which includes decreased carb and sugar intake, increased fiber intake, and increased water intake as tolerated, as well as regular exercise  - counseled on increased protein intake, goal 20-30g/meal  - goal moderate-intensity activity 30min daily / 150min per week  - will trial semaglutide dosing -- discussed side effects, dose titration (consider compound if not covered)  - to call with any questions/concerns  - semaglutide-weight management 0.25 MG/0.5ML Subcutaneous Solution Auto-injector; Inject 0.5 mL (0.25 mg total) into the skin once a week for 4 doses.  Dispense: 2 mL; Refill: 0    2. Acute pain of right knee  - encouraged gentle stretching/strengthening exercises  - encouraged to increase hydration and rest as able  - Tylenol/NSAIDs as needed  - PT as scheduled  - discussed role of Ortho eval  - to call with any questions or concerns  - Ortho Referral - In Network    Pt verbalized understanding and agrees with plan.    No orders of the defined types were placed in this encounter.      Meds This Visit:  Requested Prescriptions     Signed Prescriptions Disp Refills    semaglutide-weight management 0.25 MG/0.5ML Subcutaneous Solution Auto-injector 2 mL 0     Sig: Inject 0.5 mL (0.25 mg total) into the skin once a week for 4 doses.       Imaging & Referrals:  ORTHOPEDIC - INTERNAL         ID#[id number]       [1]   Allergies  Allergen Reactions    Seasonal ITCHING and OTHER (SEE COMMENTS)     Watery eyes, sneezing

## 2025-01-10 ENCOUNTER — TELEPHONE (OUTPATIENT)
Dept: FAMILY MEDICINE CLINIC | Facility: CLINIC | Age: 50
End: 2025-01-10

## 2025-01-10 DIAGNOSIS — E66.9 OBESITY WITH BODY MASS INDEX GREATER THAN 30: Primary | ICD-10-CM

## 2025-01-11 NOTE — TELEPHONE ENCOUNTER
Dr. Abdiaziz, Wegovy is not covered by insurance plan.     Closed    Close reason: Product not covered by this plan. Prior Authorization not available.  Payer: [employer] [facility] Case ID: [id number]    [id number] [phone]  Note from payer: Prior Authorization Not Available - This product is excluded from this benefit plan. - Prescriber details have been updated to match the prescriber directory.  View History  Pharmacy Benefits   Open Encounter DEBORAH GIBBS [id number] ([facility])    Covered: Retail, Mail Order    Unknown: Specialty, Long-Term Care  Member ID: [id number] BIN: [id number] : 1975   Group ID: [id number] PCN: [id number] Legal sex: F   Group name: [employer] [facility]   Address: [address] [place] [id number]    Medication Being Authorized    semaglutide-weight management 0.25 MG/0.5ML Subcutaneous Solution Auto-injector  Inject 0.5 mL (0.25 mg total) into the skin once a week for 4 doses.  Dispense: 2 mL Refills: 0   Start: 2025 End: 2025   Class: Normal Diagnoses: Obesity with body mass index greater than 30   This order has been released to its destination.  To be filled at: [id number]  [place], [place] - [address] [place] [facility] [place] [facility] [address], [phone], [phone]

## 2025-02-06 ENCOUNTER — APPOINTMENT (OUTPATIENT)
Dept: CT IMAGING | Facility: HOSPITAL | Age: 50
End: 2025-02-06
Attending: NURSE PRACTITIONER
Payer: COMMERCIAL

## 2025-02-06 ENCOUNTER — HOSPITAL ENCOUNTER (EMERGENCY)
Facility: HOSPITAL | Age: 50
Discharge: HOME | End: 2025-02-06
Payer: COMMERCIAL

## 2025-02-06 ENCOUNTER — NURSE TRIAGE (OUTPATIENT)
Dept: FAMILY MEDICINE CLINIC | Facility: CLINIC | Age: 50
End: 2025-02-06

## 2025-02-06 VITALS
HEART RATE: 73 BPM | OXYGEN SATURATION: 98 % | BODY MASS INDEX: 29.27 KG/M2 | WEIGHT: 155 LBS | HEIGHT: 61 IN | SYSTOLIC BLOOD PRESSURE: 124 MMHG | DIASTOLIC BLOOD PRESSURE: 73 MMHG | RESPIRATION RATE: 18 BRPM | TEMPERATURE: 98 F

## 2025-02-06 DIAGNOSIS — S09.90XA INJURY OF HEAD, INITIAL ENCOUNTER: Primary | ICD-10-CM

## 2025-02-06 DIAGNOSIS — S01.01XA LACERATION OF SCALP, INITIAL ENCOUNTER: ICD-10-CM

## 2025-02-06 PROCEDURE — 90471 IMMUNIZATION ADMIN: CPT

## 2025-02-06 PROCEDURE — 99284 EMERGENCY DEPT VISIT MOD MDM: CPT

## 2025-02-06 PROCEDURE — 70450 CT HEAD/BRAIN W/O DYE: CPT | Performed by: NURSE PRACTITIONER

## 2025-02-06 PROCEDURE — 12002 RPR S/N/AX/GEN/TRNK2.6-7.5CM: CPT

## 2025-02-06 PROCEDURE — 99283 EMERGENCY DEPT VISIT LOW MDM: CPT

## 2025-02-06 RX ORDER — LIDOCAINE HYDROCHLORIDE 10 MG/ML
20 INJECTION, SOLUTION EPIDURAL; INFILTRATION; INTRACAUDAL; PERINEURAL ONCE
Status: COMPLETED | OUTPATIENT
Start: 2025-02-06 | End: 2025-02-06

## 2025-02-06 RX ORDER — LIDOCAINE HYDROCHLORIDE 10 MG/ML
INJECTION, SOLUTION EPIDURAL; INFILTRATION; INTRACAUDAL; PERINEURAL
Status: COMPLETED
Start: 2025-02-06 | End: 2025-02-06

## 2025-02-06 RX ORDER — OMEGA-3 FATTY ACIDS/FISH OIL 300-1000MG
200 CAPSULE ORAL AS NEEDED
COMMUNITY

## 2025-02-06 NOTE — ED PROVIDER NOTES
Patient Seen in: [facility] Emergency Department      History     Chief Complaint   Patient presents with    Fall    Laceration/Abrasion     Stated Complaint: [facility] fall, head injury    Subjective:   48yo/f w no chronic medical problems reports to the ED w c/o a posterior head injury and laceration. She slipped on the icy stairs this am and struck the back of her head on a stair. No loc. Bleeding controlled w simple dressing. No loc. No seizure activity. No weakness. No vision changes. No focal deficits.               Objective:     Past Medical History:    Allergic rhinitis    Anxiety    Fibrocystic breast    Seasonal allergies              Past Surgical History:   Procedure Laterality Date      2015                Social History     Socioeconomic History    Marital status: Single   Tobacco Use    Smoking status: Former     Current packs/day: 0.00     Average packs/day: 0.5 packs/day for 3.0 years (1.5 ttl pk-yrs)     Types: Cigarettes     Start date: 1995     Quit date: 1998     Years since quittin.4    Smokeless tobacco: Never   Vaping Use    Vaping status: Never Used   Substance and Sexual Activity    Alcohol use: Not Currently     Comment: 3 years & 3 momths months alcohol free    Drug use: Never    Sexual activity: Not Currently   Other Topics Concern    History of tanning Yes    Reaction to local anesthetic No    Pt has a pacemaker No    Pt has a defibrillator No                  Physical Exam     ED Triage Vitals [25 0950]   /84   Pulse 94   Resp 20   Temp 98.1 °F (36.7 °C)   Temp src Oral   SpO2 100 %   O2 Device None (Room air)       Current Vitals:   Vital Signs  BP: 115/64  Pulse: 78  Resp: 18  Temp: 98.1 °F (36.7 °C)  Temp src: Oral    Oxygen Therapy  SpO2: 99 %  O2 Device: None (Room air)        Physical Exam  Vitals and nursing note reviewed.   Constitutional:       General: She is not in acute distress.     Appearance: She is well-developed.   HENT:       Head: Normocephalic.      Comments: Open laceration as below; no crepitus     Nose: Nose normal.      Mouth/Throat:      Mouth: Mucous membranes are moist.   Eyes:      Conjunctiva/sclera: Conjunctivae normal.      Pupils: Pupils are equal, round, and reactive to light.   Cardiovascular:      Rate and Rhythm: Normal rate and regular rhythm.      Heart sounds: Normal heart sounds.   Pulmonary:      Effort: Pulmonary effort is normal.      Breath sounds: Normal breath sounds.   Abdominal:      General: Bowel sounds are normal.      Palpations: Abdomen is soft.   Musculoskeletal:         General: No tenderness or deformity. Normal range of motion.      Cervical back: Normal range of motion and neck supple.   Skin:     General: Skin is warm and dry.      Capillary Refill: Capillary refill takes less than 2 seconds.      Findings: No rash.      Comments: Normal color   Neurological:      General: No focal deficit present.      Mental Status: She is alert and oriented to person, place, and time.      GCS: GCS eye subscore is 4. GCS verbal subscore is 5. GCS motor subscore is 6.      Cranial Nerves: No cranial nerve deficit.      Gait: Gait normal.             ED Course   Labs Reviewed - No data to display  FINDINGS:  CSF SPACES: No hydrocephalus, subarachnoid hemorrhage, or effacement of the basal cisterns is appreciated. There is no extra-axial fluid collection.  CEREBRUM: No acute intraparenchymal hemorrhage, edema, or cortical sulcal effacement is apparent. There is no space-occupying lesion, mass effect, or shift of midline structures. The gray-white matter junction is preserved and bilaterally symmetric in  appearance.  CEREBELLUM: No edema, hemorrhage, mass, or acute infarction is seen.  BRAINSTEM: No edema, hemorrhage, mass, or acute infarction is seen.    CALVARIUM: There is no apparent depressed fracture, mass, or other significant visible lesion.    SINUSES: Limited views demonstrate no significant mucosal  thickening or fluid.    ORBITS: Limited views are grossly unremarkable.    OTHER: Negative.              Impression  CONCLUSION:     No acute intracranial process by noncontrast CT technique.        [facility]     Dictated by (CST): Zeyad Martino MD on 2/06/2025 at 1:12 PM      Finalized by (CST): Zeyad Martino MD on 2/06/2025 at 1:15 PM            Lac repair  1% liod  6 staples  4cm posterior scalp laceration      Tdap updated                MDM              Medical Decision Making  50yo/f w hx and exam as stated; scalp laceration  Head injury  Tdap updated  Ct non acute  Easily repaired  No loc  No vomiting  No weakness    Plan  Dc to home  Close fu      Amount and/or Complexity of Data Reviewed  Radiology:  Decision-making details documented in ED Course.    Risk  OTC drugs.  Prescription drug management.        Disposition and Plan     Clinical Impression:  1. Injury of head, initial encounter    2. Laceration of scalp, initial encounter         Disposition:  Discharge  2/6/2025  1:28 pm    Follow-up:  Claudine Freed MD  [address] [place] [id number]  [phone]    Follow up in 1 week(s)  For suture removal, For wound re-check          Medications Prescribed:  Current Discharge Medication List              Supplementary Documentation:

## 2025-02-06 NOTE — ED INITIAL ASSESSMENT (HPI)
Patient arrives with co-worked with report of a fall at home this morning around 6:45am when walking down front outdoor stoop, landing on concrete. +laceration to back of head, bleeding controlled

## 2025-02-06 NOTE — TELEPHONE ENCOUNTER
DR Freed =Routing as For Your Information   Action Requested: Summary for Provider     []  Critical Lab, Recommendations Needed  [] Need Additional Advice  [x]   FYI    []   Need Orders  [] Need Medications Sent to Pharmacy  []  Other     SUMMARY: Advised  to go to emergency department now , someone will drive the patient to [place] ED , but requesting the office to call ED  for a heads up.   RN called [id number], spoke with Ananda and patient's information provided .     Fall today while  throwing the  school's trash and did not  realize that it was slippery. Landed on her back and hit the head on the ground,with a couple of skin cuts, actively bleeding now and  estimated size 2-3 inches together. Declined 911.            Reason for call: Fall  Onset:today          Reason for Disposition   Skin is split open or gaping (length > 1/2 inch or 12 mm)    Protocols used: Head Injury-A-OH

## 2025-02-06 NOTE — TELEPHONE ENCOUNTER
Patient is currently in ED .       ED  Pending  [facility] Emergency Department       Treatment team  Providers Fall  Laceration/Abrasion  Chief complaint     Care Timeline    0941   Arrived

## 2025-02-07 ENCOUNTER — PATIENT OUTREACH (OUTPATIENT)
Dept: CASE MANAGEMENT | Age: 50
End: 2025-02-07

## 2025-02-07 ENCOUNTER — TELEPHONE (OUTPATIENT)
Dept: FAMILY MEDICINE CLINIC | Facility: CLINIC | Age: 50
End: 2025-02-07

## 2025-02-07 NOTE — PROGRESS NOTES
Patient had recent Emergency Room visit, calling to offer Primary Care Physician follow-up appointment (discharged 02/06)    Dr Claudine Vee Abdiaziz  [address] [address] [phone]  Patient declined appointment; advised she will got to a [facility] to get her staples removed  Closing encounter

## 2025-02-07 NOTE — TELEPHONE ENCOUNTER
Patient calling today saying [facility] is indicating her RX for injectable needs to be written differently  Reviewed below with the patient and this is how she is taking this and she is using vials.   Pharmacy was asking about vials or pens.      Not clear to patient why they would not fill, from our end it appears she has the refills and they should be able to fill this.     Patient will check with [facility] and have them reach out to us if needed with details or patient will try to get more details on concerns of filling.       Medication Detail    Medication Quantity Refills Start End   CUSTOM MEDICATION 1 each 3 1/13/2025 --   Sig:   Sig:   Semaglutide/ cyanocobalamin    0.25 mg-   concentration: 1 /0.5 mg/ ML  Amount:  1 Ml vial  Instructions: inject 25 units/ 0.25 ML q weekly    Note to Pharmacy:   Telephone Information:  Home Phone      [phone]  Mobile          [phone]    KIRK:   Yes  Class:   Print Script     Route:   (none)     KIRK:   Yes     Class:   Print Script     Order #:   [id number]       No further action needed unless patient calls us back.

## 2025-02-10 NOTE — TELEPHONE ENCOUNTER
=please assist ===see note below for suture removal, .       2/6/25 ED note ;    Disposition      Discharge      ED/IC AVS (Printed 2/6/2025)      Follow-Ups: Follow up with Claudine Freed MD (Family Medicine) in 1 week (2/6/2025); For suture removal, For wound re-check        Future Appointments   Date Time Provider Department Center   2/19/2025  8:00 AM Machelle Jacobsen MD [facility] [place]   7/14/2025 10:00 AM Estefania Loyola APRN [id number]

## 2025-02-13 ENCOUNTER — OFFICE VISIT (OUTPATIENT)
Dept: FAMILY MEDICINE CLINIC | Facility: CLINIC | Age: 50
End: 2025-02-13
Payer: COMMERCIAL

## 2025-02-13 VITALS
RESPIRATION RATE: 16 BRPM | HEIGHT: 61 IN | BODY MASS INDEX: 30.58 KG/M2 | WEIGHT: 162 LBS | DIASTOLIC BLOOD PRESSURE: 77 MMHG | TEMPERATURE: 98 F | HEART RATE: 86 BPM | SYSTOLIC BLOOD PRESSURE: 126 MMHG

## 2025-02-13 DIAGNOSIS — S01.01XD LACERATION OF SCALP, SUBSEQUENT ENCOUNTER: Primary | ICD-10-CM

## 2025-02-13 PROCEDURE — 99213 OFFICE O/P EST LOW 20 MIN: CPT | Performed by: FAMILY MEDICINE

## 2025-02-13 PROCEDURE — 3008F BODY MASS INDEX DOCD: CPT | Performed by: FAMILY MEDICINE

## 2025-02-13 PROCEDURE — 3078F DIAST BP <80 MM HG: CPT | Performed by: FAMILY MEDICINE

## 2025-02-13 PROCEDURE — 3074F SYST BP LT 130 MM HG: CPT | Performed by: FAMILY MEDICINE

## 2025-02-13 NOTE — PROGRESS NOTES
Subjective:   Patient ID: Yas Newman is a 49 year old female.    Pt presents for staple removal from scalp laceration. Pt slipped on ice last week and hit the back of the head. Went to ER and CT head done which was unremarkable. Pt states no sig pains or headaches/ dizziness. No bleeding or fevers.         History/Other:   Review of Systems   Constitutional:  Negative for fever.   Neurological:  Negative for dizziness, light-headedness and headaches.     Current Outpatient Medications   Medication Sig Dispense Refill    Ibuprofen (ADVIL) 200 MG Oral Cap Take 1 capsule (200 mg total) by mouth as needed.      sertraline 25 MG Oral Tab Take 1 tablet (25 mg total) by mouth daily. 90 tablet 1    CUSTOM MEDICATION Sig:   Semaglutide/ cyanocobalamin    0.25 mg-   concentration: 1 /0.5 mg/ ML  Amount:  1 Ml vial  Instructions: inject 25 units/ 0.25 ML q weekly     Note to Pharmacy:   Telephone Information:  Home Phone      [phone]  Mobile          [phone]     KIRK:   Yes     Class:   Print Script 1 each 3    Phentermine HCl 30 MG Oral Cap Take 1 capsule (30 mg total) by mouth every morning. 30 capsule 0    progesterone (PROMETRIUM) 100 MG Oral Cap Take 1 capsule (100 mg total) by mouth nightly. 90 capsule 1    estradiol 0.05 MG/24HR Transdermal Patch Weekly Place 1 patch onto the skin once a week. 24 patch 1    magnesium 250 MG Oral Tab Take 1 tablet (250 mg total) by mouth.      multivitamin Oral Chew Tab Chew 1 tablet by mouth daily.      cyanocobalamin 1000 MCG/ML Injection Solution Inject 1 mL (1,000 mcg total) into the muscle every 30 (thirty) days. (Patient not taking: Reported on 2/13/2025) 10 mL 0    Insulin Syringe-Needle U-100 31G X 15/64\" 1 ML Does not apply Misc 1 each every 30 (thirty) days. (Patient not taking: Reported on 2/13/2025) 10 each 0     Allergies:Allergies[1]    Objective:   Physical Exam  Constitutional:       Appearance: Normal appearance.   HENT:      Head:      Comments:  Laceration healing well: 6 staples removed without problems.   No erythema or swelling. Clean.   Neurological:      General: No focal deficit present.      Mental Status: She is alert and oriented to person, place, and time.         Assessment & Plan:   1. Laceration of scalp, subsequent encounter: head injury: reviewed ER notes and CT results: doing well:  - Wound healing well; staples removed and discussed wound care; To call if any problems; Follow up as needed.          No orders of the defined types were placed in this encounter.      Meds This Visit:  Requested Prescriptions      No prescriptions requested or ordered in this encounter       Imaging & Referrals:  None         [1]   Allergies  Allergen Reactions    Seasonal ITCHING and OTHER (SEE COMMENTS)     Watery eyes, sneezing

## 2025-02-19 ENCOUNTER — OFFICE VISIT (OUTPATIENT)
Dept: OBGYN CLINIC | Facility: CLINIC | Age: 50
End: 2025-02-19

## 2025-02-19 VITALS — HEART RATE: 73 BPM | DIASTOLIC BLOOD PRESSURE: 86 MMHG | SYSTOLIC BLOOD PRESSURE: 125 MMHG

## 2025-02-19 DIAGNOSIS — Z12.31 VISIT FOR SCREENING MAMMOGRAM: Primary | ICD-10-CM

## 2025-02-19 DIAGNOSIS — Z01.419 ENCOUNTER FOR GYNECOLOGICAL EXAMINATION WITHOUT ABNORMAL FINDING: ICD-10-CM

## 2025-02-19 DIAGNOSIS — N89.8 DISCHARGE FROM THE VAGINA: ICD-10-CM

## 2025-02-19 PROCEDURE — 3079F DIAST BP 80-89 MM HG: CPT | Performed by: OBSTETRICS & GYNECOLOGY

## 2025-02-19 PROCEDURE — 99396 PREV VISIT EST AGE 40-64: CPT | Performed by: OBSTETRICS & GYNECOLOGY

## 2025-02-19 PROCEDURE — 3074F SYST BP LT 130 MM HG: CPT | Performed by: OBSTETRICS & GYNECOLOGY

## 2025-02-19 RX ORDER — ESTRADIOL 0.05 MG/D
1 PATCH TRANSDERMAL WEEKLY
Qty: 24 PATCH | Refills: 3 | Status: SHIPPED | OUTPATIENT
Start: 2025-02-19

## 2025-02-19 RX ORDER — PROGESTERONE 100 MG/1
100 CAPSULE ORAL NIGHTLY
Qty: 90 CAPSULE | Refills: 3 | Status: SHIPPED | OUTPATIENT
Start: 2025-02-19 | End: 2026-02-19

## 2025-02-19 NOTE — PROGRESS NOTES
Yas Newman is a 49 year old female  No LMP recorded. (Menstrual status: Menopause).    Chief Complaint   Patient presents with    Annual     Reviewed Preventative/Wellness form with patient.     .     She has no gyne complaints. She had early menopause at age 40 and is taking HRT.    OBSTETRICS HISTORY:  OB History    Para Term  AB Living   1 1 1 0 0 1   SAB IAB Ectopic Multiple Live Births   0 0 0 0 1       GYNE HISTORY:   Hx Prior Abnormal Pap: No  Pap Date: 06/15/22  Pap Result Notes: NEG PAP / NEG HPV  Follow Up Recommendation: MAMMO 24 NEG // Annual 24 JLK      MEDICAL HISTORY:  Past Medical History:    Allergic rhinitis    Anxiety    Fibrocystic breast    Seasonal allergies     Past Surgical History:   Procedure Laterality Date      2015         SOCIAL HISTORY:  Social History     Socioeconomic History    Marital status: Single   Tobacco Use    Smoking status: Former     Current packs/day: 0.00     Average packs/day: 0.5 packs/day for 3.0 years (1.5 ttl pk-yrs)     Types: Cigarettes     Start date: 1995     Quit date: 1998     Years since quittin.4    Smokeless tobacco: Never   Vaping Use    Vaping status: Never Used   Substance and Sexual Activity    Alcohol use: Not Currently     Comment: 3 years & 3 momths months alcohol free    Drug use: Never    Sexual activity: Not Currently   Other Topics Concern    History of tanning Yes    Reaction to local anesthetic No    Pt has a pacemaker No    Pt has a defibrillator No        FAMILY HISTORY:  Family History   Problem Relation Age of Onset    Heart Disorder Mother 72    Other (cad) Mother     Other (etoh abuse) Father     Heart Attack Paternal Grandfather 50    Cancer Neg         breast, ovarian, uterine, cervical, colon       MEDICATIONS:    Current Outpatient Medications:     estradiol 0.05 MG/24HR Transdermal Patch Weekly, Place 1 patch onto the skin once a week., Disp: 24 patch, Rfl: 3     progesterone (PROMETRIUM) 100 MG Oral Cap, Take 1 capsule (100 mg total) by mouth nightly., Disp: 90 capsule, Rfl: 3    sertraline 25 MG Oral Tab, Take 1 tablet (25 mg total) by mouth daily., Disp: 90 tablet, Rfl: 1    CUSTOM MEDICATION, Sig:   Semaglutide/ cyanocobalamin  0.25 mg-   concentration: 1 /0.5 mg/ ML Amount:  1 Ml vial Instructions: inject 25 units/ 0.25 ML q weekly   Note to Pharmacy:   Telephone Information: Home Phone      [phone] Mobile          [phone]   KIRK:   Yes    Class:   Print Script, Disp: 1 each, Rfl: 3    Insulin Syringe-Needle U-100 31G X 15/64\" 1 ML Does not apply Misc, 1 each every 30 (thirty) days., Disp: 10 each, Rfl: 0    magnesium 250 MG Oral Tab, Take 1 tablet (250 mg total) by mouth., Disp: , Rfl:     multivitamin Oral Chew Tab, Chew 1 tablet by mouth daily., Disp: , Rfl:     Ibuprofen (ADVIL) 200 MG Oral Cap, Take 1 capsule (200 mg total) by mouth as needed. (Patient not taking: Reported on 2/19/2025), Disp: , Rfl:     Phentermine HCl 30 MG Oral Cap, Take 1 capsule (30 mg total) by mouth every morning., Disp: 30 capsule, Rfl: 0    cyanocobalamin 1000 MCG/ML Injection Solution, Inject 1 mL (1,000 mcg total) into the muscle every 30 (thirty) days. (Patient not taking: Reported on 2/13/2025), Disp: 10 mL, Rfl: 0    ALLERGIES:  Allergies[1]    Blood pressure 125/86, pulse 73.    Review of Systems:  Constitutional:  Denies fatigue, night sweats, hot flashes  Eyes:  denies blurred or double vision  Cardiovascular:  denies chest pain or palpitations  Respiratory:  denies shortness of breath  Gastrointestinal:  denies heartburn, abdominal pain, diarrhea or constipation  Genitourinary:  denies dysuria, incontinence, abnormal vaginal discharge, vaginal itching  Musculoskeletal:  denies back pain.  Skin/Breast:  Denies any breast pain, lumps, or discharge.   Neurological:  denies headaches, extremity weakness or numbness.  Psychiatric: denies depression or anxiety.  Endocrine:    denies excessive thirst or urination.  Heme/Lymph:  denies history of anemia, easy bruising or bleeding.    Depression Screening (PHQ-2/PHQ-9): Over the LAST 2 WEEKS   Little interest or pleasure in doing things (over the last two weeks)?: Not at all  Little interest or pleasure in doing things: Not at all    Feeling down, depressed, or hopeless (over the last two weeks)?: Not at all  Feeling down, depressed, or hopeless: Not at all    PHQ-2 SCORE: 0  PHQ-2 SCORE: 0           PHYSICAL EXAM:   Constitutional: well developed, well nourished  Head/Face: normocephalic  Neck/Thyroid: thyroid symmetric, no thyromegaly, no nodules, no adenopathy  Lymphatic:no abnormal supraclavicular or axillary adenopathy is noted  Breast: normal without palpable masses, tenderness, asymmetry, nipple discharge, nipple retraction or skin changes  Respiratory:  lungs clear to auscultation bilaterally  Cardiovascular: regular rate and rhythm, no significant murmur  Abdomen:  soft, nontender, nondistended, no masses  Skin/Hair: no unusual rashes or bruises  Extremities: no edema, no cyanosis  Psychiatric:  Oriented to time, place, person and situation. Appropriate mood and affect    Pelvic Exam:  External Genitalia: normal appearance, hair distribution, and no lesions  Urethral Meatus:  normal in size, location, without lesions and prolapse  Bladder:  No fullness, masses or tenderness  Vagina:  Normal appearance without lesions, yellow-white frothy discharge-vaginal culture done  Cervix:  Normal without tenderness on motion  Uterus: normal in size, contour, position, mobility, without tenderness  Adnexa: normal without masses or tenderness  Perineum: normal  Anus: no hemorroids     Assessment & Plan:    Encounter Diagnoses   Name Primary?    Encounter for gynecological examination without abnormal finding     Discharge from the vagina     Visit for screening mammogram Yes     ASCCP guidelines discussed,cotest done- [id number] next  year,rtc 1 year for annual exam   SBE encouraged  Will notify of vaginal culture  No orders of the defined types were placed in this encounter.      Requested Prescriptions     Signed Prescriptions Disp Refills    estradiol 0.05 MG/24HR Transdermal Patch Weekly 24 patch 3     Sig: Place 1 patch onto the skin once a week.    progesterone (PROMETRIUM) 100 MG Oral Cap 90 capsule 3     Sig: Take 1 capsule (100 mg total) by mouth nightly.       [facility] PENELOPE 2D+3D SCREENING BILAT (CPT=77067/07367)                  [1]   Allergies  Allergen Reactions    Seasonal ITCHING and OTHER (SEE COMMENTS)     Watery eyes, sneezing

## 2025-02-20 LAB
BV BACTERIA DNA VAG QL NAA+PROBE: POSITIVE
C GLABRATA DNA VAG QL NAA+PROBE: NEGATIVE
C KRUSEI DNA VAG QL NAA+PROBE: NEGATIVE
CANDIDA DNA VAG QL NAA+PROBE: NEGATIVE
T VAGINALIS DNA VAG QL NAA+PROBE: NEGATIVE

## 2025-02-21 ENCOUNTER — TELEPHONE (OUTPATIENT)
Dept: OBGYN CLINIC | Facility: CLINIC | Age: 50
End: 2025-02-21

## 2025-02-21 NOTE — TELEPHONE ENCOUNTER
----- Message from Machelle MONIQUE Page sent at 2/21/2025 12:42 PM CST -----  Please inform pt that vag culture results were positive for BV which is an overgrowth of normal vaginal bacteria.  Please send erx for metrogel vag 1 applicator intravag qhs x 5 consecutive nights

## 2025-02-22 RX ORDER — METRONIDAZOLE 7.5 MG/G
1 GEL VAGINAL NIGHTLY
Qty: 70 G | Refills: 0 | Status: SHIPPED | OUTPATIENT
Start: 2025-02-22 | End: 2025-02-27

## 2025-03-25 RX ORDER — PROGESTERONE 100 MG/1
100 CAPSULE ORAL NIGHTLY
Qty: 90 CAPSULE | Refills: 3 | OUTPATIENT
Start: 2025-03-25

## 2025-03-25 NOTE — TELEPHONE ENCOUNTER
Last annual - 2/19/2025  Last pap - 6/15/2022, normal  Last mammogram - 8/31/2024, negative    At visit on 2/19/2025 prescription was sent for progesterone 100mg , #90 with 3 refills.  Request denied.  Patient should have refills available.

## 2025-06-18 RX ORDER — ESTRADIOL 0.05 MG/D
1 PATCH TRANSDERMAL WEEKLY
Qty: 24 PATCH | Refills: 3 | OUTPATIENT
Start: 2025-06-18

## 2025-06-18 RX ORDER — PROGESTERONE 100 MG/1
100 CAPSULE ORAL NIGHTLY
Qty: 90 CAPSULE | Refills: 3 | OUTPATIENT
Start: 2025-06-18 | End: 2026-06-18